# Patient Record
Sex: FEMALE | Race: WHITE | Employment: OTHER | URBAN - METROPOLITAN AREA
[De-identification: names, ages, dates, MRNs, and addresses within clinical notes are randomized per-mention and may not be internally consistent; named-entity substitution may affect disease eponyms.]

---

## 2017-12-03 ENCOUNTER — HOSPITAL ENCOUNTER (EMERGENCY)
Facility: HOSPITAL | Age: 67
Discharge: HOME | End: 2017-12-03
Attending: EMERGENCY MEDICINE
Payer: COMMERCIAL

## 2017-12-03 ENCOUNTER — APPOINTMENT (OUTPATIENT)
Dept: CT IMAGING | Facility: HOSPITAL | Age: 67
End: 2017-12-03
Payer: COMMERCIAL

## 2017-12-03 VITALS
HEART RATE: 63 BPM | RESPIRATION RATE: 16 BRPM | OXYGEN SATURATION: 100 % | SYSTOLIC BLOOD PRESSURE: 145 MMHG | TEMPERATURE: 98 F | WEIGHT: 162 LBS | DIASTOLIC BLOOD PRESSURE: 82 MMHG | BODY MASS INDEX: 29.81 KG/M2 | HEIGHT: 62 IN

## 2017-12-03 DIAGNOSIS — S16.1XXA NECK STRAIN, INITIAL ENCOUNTER: Primary | ICD-10-CM

## 2017-12-03 PROCEDURE — 99284 EMERGENCY DEPT VISIT MOD MDM: CPT

## 2017-12-03 PROCEDURE — 70450 CT HEAD/BRAIN W/O DYE: CPT

## 2017-12-03 PROCEDURE — 72125 CT NECK SPINE W/O DYE: CPT

## 2017-12-03 RX ORDER — CYCLOBENZAPRINE HCL 10 MG
10 TABLET ORAL 3 TIMES DAILY PRN
Qty: 20 TABLET | Refills: 0 | Status: SHIPPED | OUTPATIENT
Start: 2017-12-03 | End: 2017-12-10

## 2017-12-03 RX ORDER — HYDROCODONE BITARTRATE AND ACETAMINOPHEN 5; 325 MG/1; MG/1
1-2 TABLET ORAL EVERY 4 HOURS PRN
Qty: 20 TABLET | Refills: 0 | Status: SHIPPED | OUTPATIENT
Start: 2017-12-03 | End: 2017-12-10
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
## 2020-03-12 RX ORDER — ALBUTEROL SULFATE 90 UG/1
2 AEROSOL, METERED RESPIRATORY (INHALATION) EVERY 4 HOURS PRN
COMMUNITY

## 2020-03-12 RX ORDER — NIFEDIPINE 30 MG/1
30 TABLET, FILM COATED, EXTENDED RELEASE ORAL 2 TIMES DAILY
COMMUNITY

## 2020-03-12 RX ORDER — TACROLIMUS 1 MG/1
1 CAPSULE ORAL 2 TIMES DAILY
COMMUNITY

## 2020-03-12 RX ORDER — PREDNISONE 10 MG/1
10 TABLET ORAL DAILY
Status: ON HOLD | COMMUNITY
End: 2023-05-31 | Stop reason: HOSPADM

## 2020-03-12 RX ORDER — LEVOTHYROXINE SODIUM 0.03 MG/1
25 TABLET ORAL DAILY
COMMUNITY

## 2020-03-12 RX ORDER — SIMVASTATIN 20 MG
20 TABLET ORAL NIGHTLY
COMMUNITY
End: 2023-05-21

## 2020-03-12 RX ORDER — HYDROCODONE BITARTRATE AND ACETAMINOPHEN 10; 325 MG/1; MG/1
1 TABLET ORAL EVERY 4 HOURS PRN
COMMUNITY

## 2020-03-12 RX ORDER — GABAPENTIN 600 MG/1
1200 TABLET ORAL 2 TIMES DAILY
COMMUNITY

## 2020-03-12 RX ORDER — VANCOMYCIN HYDROCHLORIDE 250 MG/1
250 CAPSULE ORAL PRN
COMMUNITY
End: 2023-05-21

## 2020-03-12 RX ORDER — CARVEDILOL 6.25 MG/1
6.25 TABLET ORAL
COMMUNITY

## 2020-03-17 ENCOUNTER — APPOINTMENT (OUTPATIENT)
Dept: SURGERY | Age: 70
End: 2020-03-17

## 2020-05-06 DIAGNOSIS — Z94.4 LIVER TRANSPLANTED (CMD): Primary | ICD-10-CM

## 2020-05-07 ENCOUNTER — LAB SERVICES (OUTPATIENT)
Dept: LAB | Age: 70
End: 2020-05-07

## 2020-05-07 DIAGNOSIS — Z94.4 LIVER TRANSPLANTED (CMD): ICD-10-CM

## 2020-05-07 LAB
ALBUMIN SERPL-MCNC: 3.4 G/DL (ref 3.6–5.1)
ALBUMIN/GLOB SERPL: 1 {RATIO} (ref 1–2.4)
ALP SERPL-CCNC: 127 UNITS/L (ref 45–117)
ALT SERPL-CCNC: 15 UNITS/L
ANION GAP SERPL CALC-SCNC: 8 MMOL/L (ref 10–20)
AST SERPL-CCNC: 21 UNITS/L
BILIRUB SERPL-MCNC: 0.4 MG/DL (ref 0.2–1)
BUN SERPL-MCNC: 10 MG/DL (ref 6–20)
BUN/CREAT SERPL: 17 (ref 7–25)
CALCIUM SERPL-MCNC: 8.8 MG/DL (ref 8.4–10.2)
CHLORIDE SERPL-SCNC: 108 MMOL/L (ref 98–107)
CO2 SERPL-SCNC: 27 MMOL/L (ref 21–32)
CREAT SERPL-MCNC: 0.58 MG/DL (ref 0.51–0.95)
ERYTHROCYTE [DISTWIDTH] IN BLOOD: 13.7 % (ref 11–15)
GLOBULIN SER-MCNC: 3.4 G/DL (ref 2–4)
GLUCOSE SERPL-MCNC: 89 MG/DL (ref 65–99)
HCT VFR BLD CALC: 41.5 % (ref 36–46.5)
HGB BLD-MCNC: 13.3 G/DL (ref 12–15.5)
LENGTH OF FAST TIME PATIENT: ABNORMAL HRS
MCH RBC QN AUTO: 27.5 PG (ref 26–34)
MCHC RBC AUTO-ENTMCNC: 32 G/DL (ref 32–36.5)
MCV RBC AUTO: 85.9 FL (ref 78–100)
NRBC BLD MANUAL-RTO: 0 /100 WBC
PLATELET # BLD: 274 K/MCL (ref 140–450)
POTASSIUM SERPL-SCNC: 3.9 MMOL/L (ref 3.4–5.1)
PROT SERPL-MCNC: 6.8 G/DL (ref 6.4–8.2)
RBC # BLD: 4.83 MIL/MCL (ref 4–5.2)
SODIUM SERPL-SCNC: 139 MMOL/L (ref 135–145)
WBC # BLD: 6.9 K/MCL (ref 4.2–11)

## 2020-05-08 LAB — TACROLIMUS BLD-MCNC: 3.9 NG/ML (ref 5–20)

## 2020-05-12 ENCOUNTER — V-VISIT (OUTPATIENT)
Dept: SURGERY | Age: 70
End: 2020-05-12

## 2020-05-12 VITALS — HEART RATE: 57 BPM | SYSTOLIC BLOOD PRESSURE: 120 MMHG | DIASTOLIC BLOOD PRESSURE: 60 MMHG | WEIGHT: 139 LBS

## 2020-05-12 DIAGNOSIS — D84.821 IMMUNOSUPPRESSION DUE TO DRUG THERAPY (CMD): Primary | ICD-10-CM

## 2020-05-12 DIAGNOSIS — Z79.899 IMMUNOSUPPRESSION DUE TO DRUG THERAPY (CMD): Primary | ICD-10-CM

## 2020-05-12 PROCEDURE — 99203 OFFICE O/P NEW LOW 30 MIN: CPT | Performed by: SURGERY

## 2020-08-27 DIAGNOSIS — Z79.899 IMMUNOSUPPRESSION DUE TO DRUG THERAPY (CMD): ICD-10-CM

## 2020-08-27 DIAGNOSIS — Z94.4 LIVER TRANSPLANTED (CMD): Primary | ICD-10-CM

## 2020-08-27 DIAGNOSIS — D84.821 IMMUNOSUPPRESSION DUE TO DRUG THERAPY (CMD): ICD-10-CM

## 2020-08-28 ENCOUNTER — HOSPITAL (OUTPATIENT)
Dept: OTHER | Age: 70
End: 2020-08-28
Attending: INTERNAL MEDICINE

## 2022-06-24 ENCOUNTER — APPOINTMENT (OUTPATIENT)
Dept: ULTRASOUND IMAGING | Age: 72
End: 2022-06-24
Attending: EMERGENCY MEDICINE

## 2022-06-24 ENCOUNTER — HOSPITAL ENCOUNTER (EMERGENCY)
Age: 72
Discharge: HOME | End: 2022-06-25
Attending: EMERGENCY MEDICINE

## 2022-06-24 DIAGNOSIS — R60.0 EDEMA OF BOTH LEGS: Primary | ICD-10-CM

## 2022-06-24 PROCEDURE — 93005 ELECTROCARDIOGRAM TRACING: CPT | Performed by: EMERGENCY MEDICINE

## 2022-06-24 PROCEDURE — 36415 COLL VENOUS BLD VENIPUNCTURE: CPT

## 2022-06-24 PROCEDURE — 99284 EMERGENCY DEPT VISIT MOD MDM: CPT

## 2022-06-24 PROCEDURE — 93970 EXTREMITY STUDY: CPT

## 2022-06-24 ASSESSMENT — PAIN SCALES - GENERAL: PAINLEVEL_OUTOF10: 0

## 2022-06-25 ENCOUNTER — APPOINTMENT (OUTPATIENT)
Dept: GENERAL RADIOLOGY | Age: 72
End: 2022-06-25
Attending: EMERGENCY MEDICINE

## 2022-06-25 VITALS
TEMPERATURE: 98.6 F | SYSTOLIC BLOOD PRESSURE: 105 MMHG | OXYGEN SATURATION: 91 % | HEIGHT: 64 IN | WEIGHT: 155.65 LBS | DIASTOLIC BLOOD PRESSURE: 92 MMHG | HEART RATE: 61 BPM | RESPIRATION RATE: 18 BRPM | BODY MASS INDEX: 26.57 KG/M2

## 2022-06-25 LAB
ALBUMIN SERPL-MCNC: 3.2 G/DL (ref 3.6–5.1)
ALBUMIN/GLOB SERPL: 1 {RATIO} (ref 1–2.4)
ALP SERPL-CCNC: 124 UNITS/L (ref 45–117)
ALT SERPL-CCNC: 17 UNITS/L
ANION GAP SERPL CALC-SCNC: 14 MMOL/L (ref 7–19)
AST SERPL-CCNC: 14 UNITS/L
BASOPHILS # BLD: 0.1 K/MCL (ref 0–0.3)
BASOPHILS NFR BLD: 1 %
BILIRUB SERPL-MCNC: 0.3 MG/DL (ref 0.2–1)
BUN SERPL-MCNC: 19 MG/DL (ref 6–20)
BUN/CREAT SERPL: 25 (ref 7–25)
CALCIUM SERPL-MCNC: 8.7 MG/DL (ref 8.4–10.2)
CHLORIDE SERPL-SCNC: 107 MMOL/L (ref 97–110)
CO2 SERPL-SCNC: 27 MMOL/L (ref 21–32)
CREAT SERPL-MCNC: 0.76 MG/DL (ref 0.51–0.95)
DEPRECATED RDW RBC: 44.5 FL (ref 39–50)
EOSINOPHIL # BLD: 0.2 K/MCL (ref 0–0.5)
EOSINOPHIL NFR BLD: 2 %
ERYTHROCYTE [DISTWIDTH] IN BLOOD: 13.8 % (ref 11–15)
FASTING DURATION TIME PATIENT: ABNORMAL H
GFR SERPLBLD BASED ON 1.73 SQ M-ARVRAT: 84 ML/MIN
GLOBULIN SER-MCNC: 3.3 G/DL (ref 2–4)
GLUCOSE SERPL-MCNC: 126 MG/DL (ref 70–99)
HCT VFR BLD CALC: 41.8 % (ref 36–46.5)
HGB BLD-MCNC: 13.8 G/DL (ref 12–15.5)
IMM GRANULOCYTES # BLD AUTO: 0 K/MCL (ref 0–0.2)
IMM GRANULOCYTES # BLD: 0 %
LYMPHOCYTES # BLD: 2.3 K/MCL (ref 1–4)
LYMPHOCYTES NFR BLD: 28 %
MCH RBC QN AUTO: 29.2 PG (ref 26–34)
MCHC RBC AUTO-ENTMCNC: 33 G/DL (ref 32–36.5)
MCV RBC AUTO: 88.4 FL (ref 78–100)
MONOCYTES # BLD: 0.9 K/MCL (ref 0.3–0.9)
MONOCYTES NFR BLD: 11 %
NEUTROPHILS # BLD: 4.8 K/MCL (ref 1.8–7.7)
NEUTROPHILS NFR BLD: 58 %
NRBC BLD MANUAL-RTO: 0 /100 WBC
NT-PROBNP SERPL-MCNC: 446 PG/ML
PLATELET # BLD AUTO: 223 K/MCL (ref 140–450)
POTASSIUM SERPL-SCNC: 4 MMOL/L (ref 3.4–5.1)
PROT SERPL-MCNC: 6.5 G/DL (ref 6.4–8.2)
RBC # BLD: 4.73 MIL/MCL (ref 4–5.2)
SODIUM SERPL-SCNC: 144 MMOL/L (ref 135–145)
TROPONIN I SERPL DL<=0.01 NG/ML-MCNC: 8 NG/L
WBC # BLD: 8.3 K/MCL (ref 4.2–11)

## 2022-06-25 PROCEDURE — 93010 ELECTROCARDIOGRAM REPORT: CPT | Performed by: INTERNAL MEDICINE

## 2022-06-25 PROCEDURE — 80053 COMPREHEN METABOLIC PANEL: CPT | Performed by: EMERGENCY MEDICINE

## 2022-06-25 PROCEDURE — 84484 ASSAY OF TROPONIN QUANT: CPT | Performed by: EMERGENCY MEDICINE

## 2022-06-25 PROCEDURE — 83880 ASSAY OF NATRIURETIC PEPTIDE: CPT | Performed by: EMERGENCY MEDICINE

## 2022-06-25 PROCEDURE — 10002800 HB RX 250 W HCPCS

## 2022-06-25 PROCEDURE — 71045 X-RAY EXAM CHEST 1 VIEW: CPT

## 2022-06-25 PROCEDURE — 85025 COMPLETE CBC W/AUTO DIFF WBC: CPT | Performed by: EMERGENCY MEDICINE

## 2022-06-25 RX ORDER — HEPARIN 100 UNIT/ML
SYRINGE INTRAVENOUS
Status: COMPLETED
Start: 2022-06-25 | End: 2022-06-25

## 2022-06-25 RX ADMIN — SODIUM CHLORIDE, PRESERVATIVE FREE 5 ML: 5 INJECTION INTRAVENOUS at 02:30

## 2022-06-27 LAB
P AXIS (DEGREES): 83
PR-INTERVAL (MSEC): 195
QRS-INTERVAL (MSEC): 110
QT-INTERVAL (MSEC): 452
QTC: 440
R AXIS (DEGREES): -5
REPORT TEXT: NORMAL
T AXIS (DEGREES): 77
VENTRICULAR RATE EKG/MIN (BPM): 55

## 2023-05-21 ENCOUNTER — APPOINTMENT (OUTPATIENT)
Dept: GENERAL RADIOLOGY | Age: 73
DRG: 871 | End: 2023-05-21
Attending: EMERGENCY MEDICINE

## 2023-05-21 ENCOUNTER — HOSPITAL ENCOUNTER (INPATIENT)
Age: 73
LOS: 11 days | Discharge: NURSING, REHAB OR LONG-TERM CARE | DRG: 871 | End: 2023-06-01
Attending: EMERGENCY MEDICINE | Admitting: EMERGENCY MEDICINE

## 2023-05-21 ENCOUNTER — APPOINTMENT (OUTPATIENT)
Dept: CT IMAGING | Age: 73
DRG: 871 | End: 2023-05-21
Attending: EMERGENCY MEDICINE

## 2023-05-21 DIAGNOSIS — R79.89 ELEVATED TROPONIN: ICD-10-CM

## 2023-05-21 DIAGNOSIS — A41.9 SEPTIC SHOCK (CMD): Primary | ICD-10-CM

## 2023-05-21 DIAGNOSIS — J96.01 ACUTE RESPIRATORY FAILURE WITH HYPOXIA (CMD): ICD-10-CM

## 2023-05-21 DIAGNOSIS — I48.91 ATRIAL FIBRILLATION WITH RAPID VENTRICULAR RESPONSE (CMD): ICD-10-CM

## 2023-05-21 DIAGNOSIS — R65.21 SEPTIC SHOCK (CMD): Primary | ICD-10-CM

## 2023-05-21 DIAGNOSIS — E87.6 HYPOKALEMIA: ICD-10-CM

## 2023-05-21 LAB
ALBUMIN SERPL-MCNC: 2.1 G/DL (ref 3.6–5.1)
ALBUMIN SERPL-MCNC: 2.5 G/DL (ref 3.6–5.1)
ALBUMIN/GLOB SERPL: 0.6 {RATIO} (ref 1–2.4)
ALBUMIN/GLOB SERPL: 0.8 {RATIO} (ref 1–2.4)
ALP SERPL-CCNC: 82 UNITS/L (ref 45–117)
ALP SERPL-CCNC: 83 UNITS/L (ref 45–117)
ALT SERPL-CCNC: 19 UNITS/L
ALT SERPL-CCNC: 21 UNITS/L
ANION GAP SERPL CALC-SCNC: 15 MMOL/L (ref 7–19)
ANION GAP SERPL CALC-SCNC: 18 MMOL/L (ref 7–19)
APPEARANCE UR: CLEAR
APTT PPP: 57 SEC (ref 22–30)
AST SERPL-CCNC: 26 UNITS/L
AST SERPL-CCNC: 31 UNITS/L
BACTERIA #/AREA URNS HPF: ABNORMAL /HPF
BASOPHILS # BLD: 0 K/MCL (ref 0–0.3)
BASOPHILS NFR BLD: 0 %
BILIRUB SERPL-MCNC: 0.7 MG/DL (ref 0.2–1)
BILIRUB SERPL-MCNC: 0.7 MG/DL (ref 0.2–1)
BILIRUB UR QL STRIP: NEGATIVE
BUN SERPL-MCNC: 53 MG/DL (ref 6–20)
BUN SERPL-MCNC: 58 MG/DL (ref 6–20)
BUN/CREAT SERPL: 34 (ref 7–25)
BUN/CREAT SERPL: 40 (ref 7–25)
CALCIUM SERPL-MCNC: 7.8 MG/DL (ref 8.4–10.2)
CALCIUM SERPL-MCNC: 7.9 MG/DL (ref 8.4–10.2)
CHLORIDE SERPL-SCNC: 101 MMOL/L (ref 97–110)
CHLORIDE SERPL-SCNC: 99 MMOL/L (ref 97–110)
CO2 SERPL-SCNC: 22 MMOL/L (ref 21–32)
CO2 SERPL-SCNC: 28 MMOL/L (ref 21–32)
COLOR UR: YELLOW
CREAT SERPL-MCNC: 1.32 MG/DL (ref 0.51–0.95)
CREAT SERPL-MCNC: 1.73 MG/DL (ref 0.51–0.95)
DEPRECATED RDW RBC: 47 FL (ref 39–50)
EOSINOPHIL # BLD: 0 K/MCL (ref 0–0.5)
EOSINOPHIL NFR BLD: 0 %
ERYTHROCYTE [DISTWIDTH] IN BLOOD: 14.4 % (ref 11–15)
FASTING DURATION TIME PATIENT: ABNORMAL H
FASTING DURATION TIME PATIENT: ABNORMAL H
GFR SERPLBLD BASED ON 1.73 SQ M-ARVRAT: 31 ML/MIN
GFR SERPLBLD BASED ON 1.73 SQ M-ARVRAT: 43 ML/MIN
GLOBULIN SER-MCNC: 3.3 G/DL (ref 2–4)
GLOBULIN SER-MCNC: 3.6 G/DL (ref 2–4)
GLUCOSE BLDC GLUCOMTR-MCNC: 158 MG/DL (ref 70–99)
GLUCOSE SERPL-MCNC: 115 MG/DL (ref 70–99)
GLUCOSE SERPL-MCNC: 255 MG/DL (ref 70–99)
GLUCOSE UR STRIP-MCNC: NEGATIVE MG/DL
HCT VFR BLD CALC: 41 % (ref 36–46.5)
HGB BLD-MCNC: 13.2 G/DL (ref 12–15.5)
HGB UR QL STRIP: NEGATIVE
HYALINE CASTS #/AREA URNS LPF: ABNORMAL /LPF
IMM GRANULOCYTES # BLD AUTO: 0.1 K/MCL (ref 0–0.2)
IMM GRANULOCYTES # BLD: 1 %
INR PPP: 1
KETONES UR STRIP-MCNC: NEGATIVE MG/DL
LACTATE BLDV-SCNC: 1.3 MMOL/L (ref 0–2)
LDH SERPL L TO P-CCNC: 223 UNITS/L (ref 82–240)
LEUKOCYTE ESTERASE UR QL STRIP: NEGATIVE
LYMPHOCYTES # BLD: 1 K/MCL (ref 1–4)
LYMPHOCYTES NFR BLD: 8 %
MCH RBC QN AUTO: 28.8 PG (ref 26–34)
MCHC RBC AUTO-ENTMCNC: 32.2 G/DL (ref 32–36.5)
MCV RBC AUTO: 89.3 FL (ref 78–100)
MONOCYTES # BLD: 0.6 K/MCL (ref 0.3–0.9)
MONOCYTES NFR BLD: 5 %
MUCOUS THREADS URNS QL MICRO: PRESENT
NEUTROPHILS # BLD: 10.4 K/MCL (ref 1.8–7.7)
NEUTROPHILS NFR BLD: 86 %
NITRITE UR QL STRIP: NEGATIVE
NRBC BLD MANUAL-RTO: 0 /100 WBC
PH UR STRIP: 5.5 [PH] (ref 5–7)
PLATELET # BLD AUTO: 184 K/MCL (ref 140–450)
POTASSIUM SERPL-SCNC: 2.7 MMOL/L (ref 3.4–5.1)
POTASSIUM SERPL-SCNC: 4 MMOL/L (ref 3.4–5.1)
PROCALCITONIN SERPL IA-MCNC: 5.77 NG/ML
PROT SERPL-MCNC: 5.7 G/DL (ref 6.4–8.2)
PROT SERPL-MCNC: 5.8 G/DL (ref 6.4–8.2)
PROT UR STRIP-MCNC: 30 MG/DL
PROTHROMBIN TIME: 10.6 SEC (ref 9.7–11.8)
RAINBOW EXTRA TUBES HOLD SPECIMEN: NORMAL
RBC # BLD: 4.59 MIL/MCL (ref 4–5.2)
RBC #/AREA URNS HPF: ABNORMAL /HPF
SODIUM SERPL-SCNC: 137 MMOL/L (ref 135–145)
SODIUM SERPL-SCNC: 139 MMOL/L (ref 135–145)
SP GR UR STRIP: 1.03 (ref 1–1.03)
SQUAMOUS #/AREA URNS HPF: ABNORMAL /HPF
TROPONIN I SERPL DL<=0.01 NG/ML-MCNC: 128 NG/L
TROPONIN I SERPL DL<=0.01 NG/ML-MCNC: 96 NG/L
UROBILINOGEN UR STRIP-MCNC: 0.2 MG/DL
WBC # BLD: 12.2 K/MCL (ref 4.2–11)
WBC #/AREA URNS HPF: ABNORMAL /HPF

## 2023-05-21 PROCEDURE — 93005 ELECTROCARDIOGRAM TRACING: CPT | Performed by: EMERGENCY MEDICINE

## 2023-05-21 PROCEDURE — 96365 THER/PROPH/DIAG IV INF INIT: CPT

## 2023-05-21 PROCEDURE — 10004180 HB COUNTER-TRANSPORT

## 2023-05-21 PROCEDURE — 84145 PROCALCITONIN (PCT): CPT | Performed by: EMERGENCY MEDICINE

## 2023-05-21 PROCEDURE — 36415 COLL VENOUS BLD VENIPUNCTURE: CPT

## 2023-05-21 PROCEDURE — 99291 CRITICAL CARE FIRST HOUR: CPT

## 2023-05-21 PROCEDURE — 10002800 HB RX 250 W HCPCS: Performed by: EMERGENCY MEDICINE

## 2023-05-21 PROCEDURE — 96375 TX/PRO/DX INJ NEW DRUG ADDON: CPT

## 2023-05-21 PROCEDURE — 10002807 HB RX 258: Performed by: EMERGENCY MEDICINE

## 2023-05-21 PROCEDURE — 87899 AGENT NOS ASSAY W/OPTIC: CPT | Performed by: EMERGENCY MEDICINE

## 2023-05-21 PROCEDURE — 85730 THROMBOPLASTIN TIME PARTIAL: CPT | Performed by: EMERGENCY MEDICINE

## 2023-05-21 PROCEDURE — 85610 PROTHROMBIN TIME: CPT | Performed by: EMERGENCY MEDICINE

## 2023-05-21 PROCEDURE — 83615 LACTATE (LD) (LDH) ENZYME: CPT | Performed by: EMERGENCY MEDICINE

## 2023-05-21 PROCEDURE — 96367 TX/PROPH/DG ADDL SEQ IV INF: CPT

## 2023-05-21 PROCEDURE — 13003292 HB HHF OXYGEN THERAPY DAILY

## 2023-05-21 PROCEDURE — 81001 URINALYSIS AUTO W/SCOPE: CPT | Performed by: EMERGENCY MEDICINE

## 2023-05-21 PROCEDURE — P9047 ALBUMIN (HUMAN), 25%, 50ML: HCPCS | Performed by: EMERGENCY MEDICINE

## 2023-05-21 PROCEDURE — 84484 ASSAY OF TROPONIN QUANT: CPT | Performed by: EMERGENCY MEDICINE

## 2023-05-21 PROCEDURE — 94640 AIRWAY INHALATION TREATMENT: CPT

## 2023-05-21 PROCEDURE — P9045 ALBUMIN (HUMAN), 5%, 250 ML: HCPCS | Performed by: EMERGENCY MEDICINE

## 2023-05-21 PROCEDURE — 10002801 HB RX 250 W/O HCPCS: Performed by: EMERGENCY MEDICINE

## 2023-05-21 PROCEDURE — 94660 CPAP INITIATION&MGMT: CPT

## 2023-05-21 PROCEDURE — 71045 X-RAY EXAM CHEST 1 VIEW: CPT

## 2023-05-21 PROCEDURE — 96368 THER/DIAG CONCURRENT INF: CPT

## 2023-05-21 PROCEDURE — 10004651 HB RX, NO CHARGE ITEM: Performed by: EMERGENCY MEDICINE

## 2023-05-21 PROCEDURE — 85025 COMPLETE CBC W/AUTO DIFF WBC: CPT | Performed by: EMERGENCY MEDICINE

## 2023-05-21 PROCEDURE — 83605 ASSAY OF LACTIC ACID: CPT | Performed by: EMERGENCY MEDICINE

## 2023-05-21 PROCEDURE — 10002803 HB RX 637: Performed by: EMERGENCY MEDICINE

## 2023-05-21 PROCEDURE — 87040 BLOOD CULTURE FOR BACTERIA: CPT | Performed by: EMERGENCY MEDICINE

## 2023-05-21 PROCEDURE — 10002801 HB RX 250 W/O HCPCS

## 2023-05-21 PROCEDURE — 80053 COMPREHEN METABOLIC PANEL: CPT | Performed by: EMERGENCY MEDICINE

## 2023-05-21 PROCEDURE — 96372 THER/PROPH/DIAG INJ SC/IM: CPT | Performed by: EMERGENCY MEDICINE

## 2023-05-21 PROCEDURE — 10000008 HB ROOM CHARGE ICU OR CCU

## 2023-05-21 PROCEDURE — 87449 NOS EACH ORGANISM AG IA: CPT | Performed by: EMERGENCY MEDICINE

## 2023-05-21 PROCEDURE — 71250 CT THORAX DX C-: CPT

## 2023-05-21 RX ORDER — 0.9 % SODIUM CHLORIDE 0.9 %
2 VIAL (ML) INJECTION EVERY 12 HOURS SCHEDULED
Status: DISCONTINUED | OUTPATIENT
Start: 2023-05-21 | End: 2023-06-01 | Stop reason: HOSPADM

## 2023-05-21 RX ORDER — IPRATROPIUM BROMIDE AND ALBUTEROL SULFATE 2.5; .5 MG/3ML; MG/3ML
3 SOLUTION RESPIRATORY (INHALATION) ONCE
Status: COMPLETED | OUTPATIENT
Start: 2023-05-21 | End: 2023-05-21

## 2023-05-21 RX ORDER — MONTELUKAST SODIUM 10 MG/1
10 TABLET ORAL AT BEDTIME
COMMUNITY
Start: 2023-03-20

## 2023-05-21 RX ORDER — ALBUMIN, HUMAN INJ 5% 5 %
25 SOLUTION INTRAVENOUS ONCE
Status: COMPLETED | OUTPATIENT
Start: 2023-05-21 | End: 2023-05-21

## 2023-05-21 RX ORDER — POTASSIUM CHLORIDE 14.9 MG/ML
20 INJECTION INTRAVENOUS ONCE
Status: COMPLETED | OUTPATIENT
Start: 2023-05-21 | End: 2023-05-21

## 2023-05-21 RX ORDER — NOREPINEPHRINE BITARTRATE/D5W 8 MG/250ML
0-30 PLASTIC BAG, INJECTION (ML) INTRAVENOUS CONTINUOUS
Status: DISCONTINUED | OUTPATIENT
Start: 2023-05-21 | End: 2023-05-22

## 2023-05-21 RX ORDER — METOCLOPRAMIDE HYDROCHLORIDE 5 MG/ML
10 INJECTION INTRAMUSCULAR; INTRAVENOUS ONCE
Status: DISCONTINUED | OUTPATIENT
Start: 2023-05-21 | End: 2023-05-22

## 2023-05-21 RX ORDER — FUROSEMIDE 40 MG/1
40 TABLET ORAL DAILY
COMMUNITY
Start: 2023-03-20

## 2023-05-21 RX ORDER — ALBUMIN (HUMAN) 12.5 G/50ML
25 SOLUTION INTRAVENOUS ONCE
Status: COMPLETED | OUTPATIENT
Start: 2023-05-21 | End: 2023-05-21

## 2023-05-21 RX ORDER — LEVOTHYROXINE SODIUM 0.03 MG/1
25 TABLET ORAL
Status: DISCONTINUED | OUTPATIENT
Start: 2023-05-22 | End: 2023-06-01 | Stop reason: HOSPADM

## 2023-05-21 RX ORDER — METOCLOPRAMIDE HYDROCHLORIDE 5 MG/ML
10 INJECTION INTRAMUSCULAR; INTRAVENOUS EVERY 6 HOURS PRN
Status: DISCONTINUED | OUTPATIENT
Start: 2023-05-21 | End: 2023-06-01 | Stop reason: HOSPADM

## 2023-05-21 RX ORDER — TACROLIMUS 1 MG/1
1 CAPSULE ORAL
Status: DISCONTINUED | OUTPATIENT
Start: 2023-05-21 | End: 2023-05-24

## 2023-05-21 RX ORDER — ONDANSETRON 4 MG/1
4 TABLET, ORALLY DISINTEGRATING ORAL EVERY 12 HOURS PRN
Status: DISCONTINUED | OUTPATIENT
Start: 2023-05-21 | End: 2023-06-01 | Stop reason: HOSPADM

## 2023-05-21 RX ORDER — COLCHICINE 0.6 MG/1
0.6 TABLET ORAL DAILY
COMMUNITY
Start: 2023-04-03

## 2023-05-21 RX ORDER — CEFAZOLIN SODIUM/WATER 1 G/10 ML
1000 SYRINGE (ML) INTRAVENOUS ONCE
Status: COMPLETED | OUTPATIENT
Start: 2023-05-21 | End: 2023-05-21

## 2023-05-21 RX ORDER — MONTELUKAST SODIUM 10 MG/1
10 TABLET ORAL AT BEDTIME
Status: DISCONTINUED | OUTPATIENT
Start: 2023-05-21 | End: 2023-06-01 | Stop reason: HOSPADM

## 2023-05-21 RX ORDER — HEPARIN SODIUM 5000 [USP'U]/ML
5000 INJECTION, SOLUTION INTRAVENOUS; SUBCUTANEOUS EVERY 8 HOURS SCHEDULED
Status: DISCONTINUED | OUTPATIENT
Start: 2023-05-21 | End: 2023-05-23

## 2023-05-21 RX ORDER — SODIUM CHLORIDE 9 MG/ML
INJECTION, SOLUTION INTRAVENOUS CONTINUOUS PRN
Status: DISCONTINUED | OUTPATIENT
Start: 2023-05-21 | End: 2023-05-25

## 2023-05-21 RX ORDER — NOREPINEPHRINE BITARTRATE/D5W 8 MG/250ML
PLASTIC BAG, INJECTION (ML) INTRAVENOUS
Status: COMPLETED
Start: 2023-05-21 | End: 2023-05-21

## 2023-05-21 RX ORDER — METOCLOPRAMIDE 5 MG/1
5 TABLET ORAL EVERY 6 HOURS PRN
Status: DISCONTINUED | OUTPATIENT
Start: 2023-05-21 | End: 2023-06-01 | Stop reason: HOSPADM

## 2023-05-21 RX ORDER — ACETAMINOPHEN 325 MG/1
650 TABLET ORAL EVERY 4 HOURS PRN
Status: DISCONTINUED | OUTPATIENT
Start: 2023-05-21 | End: 2023-06-01 | Stop reason: HOSPADM

## 2023-05-21 RX ORDER — METHYLPREDNISOLONE SODIUM SUCCINATE 125 MG/2ML
125 INJECTION, POWDER, LYOPHILIZED, FOR SOLUTION INTRAMUSCULAR; INTRAVENOUS ONCE
Status: COMPLETED | OUTPATIENT
Start: 2023-05-21 | End: 2023-05-21

## 2023-05-21 RX ORDER — LIDOCAINE HYDROCHLORIDE 20 MG/ML
10 JELLY TOPICAL
Status: DISCONTINUED | OUTPATIENT
Start: 2023-05-21 | End: 2023-05-25

## 2023-05-21 RX ORDER — ONDANSETRON 2 MG/ML
4 INJECTION INTRAMUSCULAR; INTRAVENOUS EVERY 12 HOURS PRN
Status: DISCONTINUED | OUTPATIENT
Start: 2023-05-21 | End: 2023-06-01 | Stop reason: HOSPADM

## 2023-05-21 RX ORDER — ASPIRIN 81 MG/1
81 TABLET ORAL DAILY
Status: DISCONTINUED | OUTPATIENT
Start: 2023-05-22 | End: 2023-05-26

## 2023-05-21 RX ORDER — SODIUM CHLORIDE 9 MG/ML
INJECTION, SOLUTION INTRAVENOUS CONTINUOUS PRN
Status: DISCONTINUED | OUTPATIENT
Start: 2023-05-21 | End: 2023-06-01 | Stop reason: HOSPADM

## 2023-05-21 RX ADMIN — PIPERACILLIN AND TAZOBACTAM 3.38 G: 3; .375 INJECTION, POWDER, FOR SOLUTION INTRAVENOUS at 14:19

## 2023-05-21 RX ADMIN — HEPARIN SODIUM 5000 UNITS: 5000 INJECTION INTRAVENOUS; SUBCUTANEOUS at 22:01

## 2023-05-21 RX ADMIN — POTASSIUM CHLORIDE 20 MEQ: 14.9 INJECTION, SOLUTION INTRAVENOUS at 12:58

## 2023-05-21 RX ADMIN — VANCOMYCIN HYDROCHLORIDE 1500 MG: 10 INJECTION, POWDER, LYOPHILIZED, FOR SOLUTION INTRAVENOUS at 14:19

## 2023-05-21 RX ADMIN — Medication 2 MCG/MIN: at 12:34

## 2023-05-21 RX ADMIN — POTASSIUM CHLORIDE 20 MEQ: 14.9 INJECTION, SOLUTION INTRAVENOUS at 16:12

## 2023-05-21 RX ADMIN — SODIUM CHLORIDE 2100 ML: 9 INJECTION, SOLUTION INTRAVENOUS at 11:35

## 2023-05-21 RX ADMIN — NOREPINEPHRINE BITARTRATE 2 MCG/MIN: 8 INJECTION, SOLUTION INTRAVENOUS at 12:34

## 2023-05-21 RX ADMIN — METHYLPREDNISOLONE SODIUM SUCCINATE 125 MG: 125 INJECTION, POWDER, FOR SOLUTION INTRAMUSCULAR; INTRAVENOUS at 11:30

## 2023-05-21 RX ADMIN — TACROLIMUS 1 MG: 1 CAPSULE ORAL at 20:01

## 2023-05-21 RX ADMIN — HEPARIN SODIUM 5000 UNITS: 5000 INJECTION INTRAVENOUS; SUBCUTANEOUS at 16:22

## 2023-05-21 RX ADMIN — ALBUMIN HUMAN 25 G: 0.25 SOLUTION INTRAVENOUS at 19:00

## 2023-05-21 RX ADMIN — ALBUMIN HUMAN 25 G: 0.05 INJECTION, SOLUTION INTRAVENOUS at 16:22

## 2023-05-21 RX ADMIN — IPRATROPIUM BROMIDE AND ALBUTEROL SULFATE 3 ML: .5; 3 SOLUTION RESPIRATORY (INHALATION) at 11:28

## 2023-05-21 RX ADMIN — IPRATROPIUM BROMIDE AND ALBUTEROL SULFATE 3 ML: .5; 3 SOLUTION RESPIRATORY (INHALATION) at 11:27

## 2023-05-21 RX ADMIN — MONTELUKAST SODIUM 10 MG: 10 TABLET, FILM COATED ORAL at 20:01

## 2023-05-21 RX ADMIN — CEFTRIAXONE SODIUM 1000 MG: 10 INJECTION, POWDER, FOR SOLUTION INTRAVENOUS at 11:30

## 2023-05-21 RX ADMIN — HYDROCORTISONE SODIUM SUCCINATE 100 MG: 100 INJECTION, POWDER, FOR SOLUTION INTRAMUSCULAR; INTRAVENOUS at 19:59

## 2023-05-21 RX ADMIN — CEFEPIME 2000 MG: 2 INJECTION, POWDER, FOR SOLUTION INTRAVENOUS at 18:28

## 2023-05-21 RX ADMIN — AZITHROMYCIN 500 MG: 500 INJECTION, POWDER, LYOPHILIZED, FOR SOLUTION INTRAVENOUS at 11:30

## 2023-05-21 RX ADMIN — SODIUM CHLORIDE, PRESERVATIVE FREE 2 ML: 5 INJECTION INTRAVENOUS at 20:08

## 2023-05-21 SDOH — HEALTH STABILITY: GENERAL: BECAUSE OF A PHYSICAL, MENTAL, OR EMOTIONAL CONDITION, DO YOU HAVE DIFFICULTY DOING ERRANDS ALONE?: NO

## 2023-05-21 SDOH — HEALTH STABILITY: GENERAL
BECAUSE OF A PHYSICAL, MENTAL, OR EMOTIONAL CONDITION, DO YOU HAVE SERIOUS DIFFICULTY CONCENTRATING, REMEMBERING OR MAKING DECISIONS?: NO

## 2023-05-21 SDOH — SOCIAL STABILITY: SOCIAL NETWORK
HOW OFTEN DO YOU SEE OR TALK TO PEOPLE THAT YOU CARE ABOUT AND FEEL CLOSE TO? (FOR EXAMPLE: TALKING TO FRIENDS ON THE PHONE, VISITING FRIENDS OR FAMILY, GOING TO CHURCH OR CLUB MEETINGS): 5 OR MORE TIMES A WEEK

## 2023-05-21 SDOH — ECONOMIC STABILITY: GENERAL

## 2023-05-21 SDOH — HEALTH STABILITY: PHYSICAL HEALTH: DO YOU HAVE DIFFICULTY DRESSING OR BATHING?: NO

## 2023-05-21 SDOH — ECONOMIC STABILITY: TRANSPORTATION INSECURITY
IN THE PAST 12 MONTHS, HAS LACK OF TRANSPORTATION KEPT YOU FROM MEETINGS, WORK, OR FROM GETTING THINGS NEEDED FOR DAILY LIVING?: NO

## 2023-05-21 SDOH — SOCIAL STABILITY: SOCIAL NETWORK: SUPPORT SYSTEMS: CHILDREN;FAMILY MEMBERS;FRIENDS;NEIGHBORS

## 2023-05-21 SDOH — ECONOMIC STABILITY: HOUSING INSECURITY: WHAT IS YOUR LIVING SITUATION TODAY?: CHILDREN

## 2023-05-21 SDOH — ECONOMIC STABILITY: HOUSING INSECURITY: WHAT IS YOUR LIVING SITUATION TODAY?: HOUSE

## 2023-05-21 SDOH — ECONOMIC STABILITY: HOUSING INSECURITY: ARE YOU WORRIED ABOUT LOSING YOUR HOUSING?: NO

## 2023-05-21 SDOH — ECONOMIC STABILITY: TRANSPORTATION INSECURITY
IN THE PAST 12 MONTHS, HAS THE LACK OF TRANSPORTATION KEPT YOU FROM MEDICAL APPOINTMENTS OR FROM GETTING MEDICATIONS?: NO

## 2023-05-21 SDOH — HEALTH STABILITY: PHYSICAL HEALTH: DO YOU HAVE SERIOUS DIFFICULTY WALKING OR CLIMBING STAIRS?: NO

## 2023-05-21 SDOH — ECONOMIC STABILITY: FOOD INSECURITY: HOW OFTEN IN THE PAST 12 MONTHS WERE YOU WORRIED OR STRESSED ABOUT HAVING ENOUGH MONEY TO BUY NUTRITIOUS MEALS?: NEVER

## 2023-05-21 ASSESSMENT — ORIENTATION MEMORY CONCENTRATION TEST (OMCT)
REPEAT THE NAME AND ADDRESS I ASKED YOU TO REMEMBER: CORRECT
COUNT BACKWARDS FROM 20 TO 1: 1 ERROR
WHAT MONTH IS IT NOW: CORRECT
OMCT INTERPRETATION: 7-10: MILD COGNITIVE IMPAIRMENT
WHAT YEAR IS IT NOW (MUST BE EXACT): CORRECT
WHAT TIME IS IT (NO WATCH OR CLOCK): INCORRECT
SAY THE MONTHS IN REVERSE ORDER STARTING WITH LAST MONTH: 1 ERROR
OMCT SCORE: 7

## 2023-05-21 ASSESSMENT — LIFESTYLE VARIABLES
AUDIT-C TOTAL SCORE: 0
HOW MANY STANDARD DRINKS CONTAINING ALCOHOL DO YOU HAVE ON A TYPICAL DAY: 0,1 OR 2
ALCOHOL_USE_STATUS: NO OR LOW RISK WITH VALIDATED TOOL
HOW OFTEN DO YOU HAVE 6 OR MORE DRINKS ON ONE OCCASION: NEVER
HOW OFTEN DO YOU HAVE A DRINK CONTAINING ALCOHOL: NEVER

## 2023-05-21 ASSESSMENT — PATIENT HEALTH QUESTIONNAIRE - PHQ9
CLINICAL INTERPRETATION OF PHQ2 SCORE: NO FURTHER SCREENING NEEDED
IS PATIENT ABLE TO COMPLETE PHQ2 OR PHQ9: YES
SUM OF ALL RESPONSES TO PHQ9 QUESTIONS 1 AND 2: 0

## 2023-05-21 ASSESSMENT — ACTIVITIES OF DAILY LIVING (ADL)
RECENT_DECLINE_ADL: YES, ACUTE ILLNESS WITHOUT THERAPY NEEDS
ADL_SCORE: 12
ADL_SHORT_OF_BREATH: NO
ADL_BEFORE_ADMISSION: INDEPENDENT

## 2023-05-21 ASSESSMENT — PAIN SCALES - GENERAL
PAINLEVEL_OUTOF10: 0
PAINLEVEL_OUTOF10: 0

## 2023-05-22 ENCOUNTER — APPOINTMENT (OUTPATIENT)
Dept: CARDIOLOGY | Age: 73
DRG: 871 | End: 2023-05-22
Attending: EMERGENCY MEDICINE

## 2023-05-22 LAB
ALBUMIN SERPL-MCNC: 2.8 G/DL (ref 3.6–5.1)
ALBUMIN/GLOB SERPL: 0.9 {RATIO} (ref 1–2.4)
ALP SERPL-CCNC: 72 UNITS/L (ref 45–117)
ALT SERPL-CCNC: 20 UNITS/L
ANION GAP SERPL CALC-SCNC: 15 MMOL/L (ref 7–19)
AORTIC VALVE AREA (AVA): 0.94
AORTIC VALVE AREA: 2.67
ASCENDING AORTA (AAD): 3
AST SERPL-CCNC: 17 UNITS/L
AV MEAN GRADIENT (AVMG): 2
AV MEAN VELOCITY (AVMV): 0.68
AV PEAK GRADIENT (AVPG): 4
AV PEAK VELOCITY (AVPV): 0.99
AV STENOSIS SEVERITY TEXT: NORMAL
AVI LVOT PEAK GRADIENT (LVOTMG): 0.7
BASOPHILS # BLD: 0 K/MCL (ref 0–0.3)
BASOPHILS NFR BLD: 0 %
BILIRUB SERPL-MCNC: 0.6 MG/DL (ref 0.2–1)
BUN SERPL-MCNC: 38 MG/DL (ref 6–20)
BUN/CREAT SERPL: 45 (ref 7–25)
CALCIUM SERPL-MCNC: 8.4 MG/DL (ref 8.4–10.2)
CHLORIDE SERPL-SCNC: 104 MMOL/L (ref 97–110)
CO2 SERPL-SCNC: 26 MMOL/L (ref 21–32)
CREAT SERPL-MCNC: 0.84 MG/DL (ref 0.51–0.95)
DEPRECATED RDW RBC: 46.2 FL (ref 39–50)
E WAVE DECELARATION TIME (MDT): 8.9
EOSINOPHIL # BLD: 0 K/MCL (ref 0–0.5)
EOSINOPHIL NFR BLD: 0 %
ERYTHROCYTE [DISTWIDTH] IN BLOOD: 14.4 % (ref 11–15)
FASTING DURATION TIME PATIENT: ABNORMAL H
FLUAV RNA RESP QL NAA+PROBE: NOT DETECTED
FLUBV RNA RESP QL NAA+PROBE: NOT DETECTED
GFR SERPLBLD BASED ON 1.73 SQ M-ARVRAT: 74 ML/MIN
GLOBULIN SER-MCNC: 3.1 G/DL (ref 2–4)
GLUCOSE BLDC GLUCOMTR-MCNC: 216 MG/DL (ref 70–99)
GLUCOSE SERPL-MCNC: 172 MG/DL (ref 70–99)
HCT VFR BLD CALC: 39 % (ref 36–46.5)
HGB BLD-MCNC: 12.8 G/DL (ref 12–15.5)
IMM GRANULOCYTES # BLD AUTO: 0.1 K/MCL (ref 0–0.2)
IMM GRANULOCYTES # BLD: 1 %
INTERVENTRICULAR SEPTUM IN END DIASTOLE (IVSD): 1.31
L PNEUMO1 AG UR QL IA.RAPID: NORMAL
LEFT INTERNAL DIMENSION IN SYSTOLE (LVSD): 1
LEFT VENTRICULAR INTERNAL DIMENSION IN DIASTOLE (LVDD): 2.7
LEFT VENTRICULAR POSTERIOR WALL IN END DIASTOLE (LVPW): 4.2
LV EF: NORMAL %
LVOT 2D (LVOTD): 14.3
LVOT VTI (LVOTVTI): 0.81
LYMPHOCYTES # BLD: 0.3 K/MCL (ref 1–4)
LYMPHOCYTES NFR BLD: 4 %
MAGNESIUM SERPL-MCNC: 1.7 MG/DL (ref 1.7–2.4)
MCH RBC QN AUTO: 28.9 PG (ref 26–34)
MCHC RBC AUTO-ENTMCNC: 32.8 G/DL (ref 32–36.5)
MCV RBC AUTO: 88 FL (ref 78–100)
MONOCYTES # BLD: 0.3 K/MCL (ref 0.3–0.9)
MONOCYTES NFR BLD: 3 %
MRSA DNA SPEC QL NAA+PROBE: NOT DETECTED
MV E TISSUE VEL LAT (MELV): 0.82
MV E TISSUE VEL MED (MESV): 11.2
MV E WAVE VEL/E TISSUE VEL MED(MSR): 10.6
MV PEAK A VELOCITY (MVPAV): 95
NEUTROPHILS # BLD: 7.7 K/MCL (ref 1.8–7.7)
NEUTROPHILS NFR BLD: 92 %
NRBC BLD MANUAL-RTO: 0 /100 WBC
PHOSPHATE SERPL-MCNC: 3.1 MG/DL (ref 2.4–4.7)
PLATELET # BLD AUTO: 144 K/MCL (ref 140–450)
POTASSIUM SERPL-SCNC: 3.4 MMOL/L (ref 3.4–5.1)
PROT SERPL-MCNC: 5.9 G/DL (ref 6.4–8.2)
RBC # BLD: 4.43 MIL/MCL (ref 4–5.2)
RV END SYSTOLIC LONGITUDINAL STRAIN FREE WALL (RVGS): 2
S PNEUM AG UR QL IA.RAPID: NORMAL
SARS-COV-2 RNA RESP QL NAA+PROBE: NOT DETECTED
SERVICE CMNT-IMP: NORMAL
SERVICE CMNT-IMP: NORMAL
SODIUM SERPL-SCNC: 142 MMOL/L (ref 135–145)
TACROLIMUS BLD-MCNC: 11.7 NG/ML (ref 5–20)
TRICUSPID VALVE ANNULAR PEAK VELOCITY (TVAPV): 25
TV ESTIMATED RIGHT ARTERIAL PRESSURE (RAP): 13.4
WBC # BLD: 8.3 K/MCL (ref 4.2–11)

## 2023-05-22 PROCEDURE — P9047 ALBUMIN (HUMAN), 25%, 50ML: HCPCS | Performed by: EMERGENCY MEDICINE

## 2023-05-22 PROCEDURE — 10002800 HB RX 250 W HCPCS: Performed by: ANESTHESIOLOGY

## 2023-05-22 PROCEDURE — 10002800 HB RX 250 W HCPCS: Performed by: EMERGENCY MEDICINE

## 2023-05-22 PROCEDURE — 10000008 HB ROOM CHARGE ICU OR CCU

## 2023-05-22 PROCEDURE — 87641 MR-STAPH DNA AMP PROBE: CPT | Performed by: INTERNAL MEDICINE

## 2023-05-22 PROCEDURE — 94667 MNPJ CHEST WALL 1ST: CPT

## 2023-05-22 PROCEDURE — 84100 ASSAY OF PHOSPHORUS: CPT | Performed by: EMERGENCY MEDICINE

## 2023-05-22 PROCEDURE — 80197 ASSAY OF TACROLIMUS: CPT | Performed by: EMERGENCY MEDICINE

## 2023-05-22 PROCEDURE — 10004651 HB RX, NO CHARGE ITEM: Performed by: EMERGENCY MEDICINE

## 2023-05-22 PROCEDURE — 10002801 HB RX 250 W/O HCPCS: Performed by: EMERGENCY MEDICINE

## 2023-05-22 PROCEDURE — 0240U SARS-COV-2/INFLUENZA BY PCR: CPT | Performed by: NURSE PRACTITIONER

## 2023-05-22 PROCEDURE — 93306 TTE W/DOPPLER COMPLETE: CPT

## 2023-05-22 PROCEDURE — 93005 ELECTROCARDIOGRAM TRACING: CPT | Performed by: EMERGENCY MEDICINE

## 2023-05-22 PROCEDURE — 13003292 HB HHF OXYGEN THERAPY DAILY

## 2023-05-22 PROCEDURE — 80053 COMPREHEN METABOLIC PANEL: CPT | Performed by: EMERGENCY MEDICINE

## 2023-05-22 PROCEDURE — 93306 TTE W/DOPPLER COMPLETE: CPT | Performed by: INTERNAL MEDICINE

## 2023-05-22 PROCEDURE — 99222 1ST HOSP IP/OBS MODERATE 55: CPT | Performed by: SURGERY

## 2023-05-22 PROCEDURE — 87633 RESP VIRUS 12-25 TARGETS: CPT | Performed by: EMERGENCY MEDICINE

## 2023-05-22 PROCEDURE — 10002807 HB RX 258: Performed by: EMERGENCY MEDICINE

## 2023-05-22 PROCEDURE — 96372 THER/PROPH/DIAG INJ SC/IM: CPT | Performed by: EMERGENCY MEDICINE

## 2023-05-22 PROCEDURE — 85025 COMPLETE CBC W/AUTO DIFF WBC: CPT | Performed by: EMERGENCY MEDICINE

## 2023-05-22 PROCEDURE — 94640 AIRWAY INHALATION TREATMENT: CPT

## 2023-05-22 PROCEDURE — 87449 NOS EACH ORGANISM AG IA: CPT | Performed by: SURGERY

## 2023-05-22 PROCEDURE — 83735 ASSAY OF MAGNESIUM: CPT | Performed by: EMERGENCY MEDICINE

## 2023-05-22 PROCEDURE — 87070 CULTURE OTHR SPECIMN AEROBIC: CPT | Performed by: EMERGENCY MEDICINE

## 2023-05-22 PROCEDURE — 10002803 HB RX 637: Performed by: EMERGENCY MEDICINE

## 2023-05-22 PROCEDURE — 10002801 HB RX 250 W/O HCPCS

## 2023-05-22 RX ORDER — COLCHICINE 0.6 MG/1
0.6 TABLET ORAL DAILY
Status: DISCONTINUED | OUTPATIENT
Start: 2023-05-22 | End: 2023-06-01 | Stop reason: HOSPADM

## 2023-05-22 RX ORDER — GABAPENTIN 300 MG/1
600 CAPSULE ORAL EVERY 12 HOURS SCHEDULED
Status: DISCONTINUED | OUTPATIENT
Start: 2023-05-22 | End: 2023-05-30

## 2023-05-22 RX ORDER — DEXTROSE MONOHYDRATE 25 G/50ML
25 INJECTION, SOLUTION INTRAVENOUS PRN
Status: DISCONTINUED | OUTPATIENT
Start: 2023-05-22 | End: 2023-06-01 | Stop reason: HOSPADM

## 2023-05-22 RX ORDER — IPRATROPIUM BROMIDE AND ALBUTEROL SULFATE 2.5; .5 MG/3ML; MG/3ML
3 SOLUTION RESPIRATORY (INHALATION)
Status: DISCONTINUED | OUTPATIENT
Start: 2023-05-22 | End: 2023-06-01 | Stop reason: HOSPADM

## 2023-05-22 RX ORDER — METHYLPREDNISOLONE SODIUM SUCCINATE 40 MG/ML
40 INJECTION, POWDER, LYOPHILIZED, FOR SOLUTION INTRAMUSCULAR; INTRAVENOUS DAILY
Status: DISCONTINUED | OUTPATIENT
Start: 2023-05-23 | End: 2023-05-23

## 2023-05-22 RX ORDER — PREDNISONE 50 MG/1
50 TABLET ORAL
Status: DISCONTINUED | OUTPATIENT
Start: 2023-05-23 | End: 2023-05-22

## 2023-05-22 RX ORDER — NICOTINE POLACRILEX 4 MG
30 LOZENGE BUCCAL PRN
Status: DISCONTINUED | OUTPATIENT
Start: 2023-05-22 | End: 2023-06-01 | Stop reason: HOSPADM

## 2023-05-22 RX ORDER — IPRATROPIUM BROMIDE AND ALBUTEROL SULFATE 2.5; .5 MG/3ML; MG/3ML
SOLUTION RESPIRATORY (INHALATION)
Status: COMPLETED
Start: 2023-05-22 | End: 2023-05-22

## 2023-05-22 RX ORDER — PREDNISONE 10 MG/1
10 TABLET ORAL
Status: DISCONTINUED | OUTPATIENT
Start: 2023-05-23 | End: 2023-05-22

## 2023-05-22 RX ORDER — PREDNISONE 10 MG/1
10 TABLET ORAL
Status: DISCONTINUED | OUTPATIENT
Start: 2023-05-29 | End: 2023-05-24

## 2023-05-22 RX ORDER — CARVEDILOL 3.12 MG/1
3.12 TABLET ORAL EVERY 12 HOURS SCHEDULED
Status: DISCONTINUED | OUTPATIENT
Start: 2023-05-22 | End: 2023-05-23

## 2023-05-22 RX ORDER — ALBUMIN (HUMAN) 12.5 G/50ML
25 SOLUTION INTRAVENOUS ONCE
Status: COMPLETED | OUTPATIENT
Start: 2023-05-22 | End: 2023-05-23

## 2023-05-22 RX ORDER — POTASSIUM CHLORIDE 14.9 MG/ML
20 INJECTION INTRAVENOUS ONCE
Status: COMPLETED | OUTPATIENT
Start: 2023-05-22 | End: 2023-05-22

## 2023-05-22 RX ORDER — DEXTROSE MONOHYDRATE 25 G/50ML
12.5 INJECTION, SOLUTION INTRAVENOUS PRN
Status: DISCONTINUED | OUTPATIENT
Start: 2023-05-22 | End: 2023-06-01 | Stop reason: HOSPADM

## 2023-05-22 RX ORDER — NICOTINE POLACRILEX 4 MG
15 LOZENGE BUCCAL PRN
Status: DISCONTINUED | OUTPATIENT
Start: 2023-05-22 | End: 2023-06-01 | Stop reason: HOSPADM

## 2023-05-22 RX ORDER — METHYLPREDNISOLONE SODIUM SUCCINATE 40 MG/ML
40 INJECTION, POWDER, LYOPHILIZED, FOR SOLUTION INTRAMUSCULAR; INTRAVENOUS DAILY
Status: DISCONTINUED | OUTPATIENT
Start: 2023-05-23 | End: 2023-05-22

## 2023-05-22 RX ORDER — ALBUTEROL SULFATE 2.5 MG/3ML
2.5 SOLUTION RESPIRATORY (INHALATION)
Status: DISCONTINUED | OUTPATIENT
Start: 2023-05-22 | End: 2023-06-01 | Stop reason: HOSPADM

## 2023-05-22 RX ORDER — METOPROLOL TARTRATE 1 MG/ML
5 INJECTION, SOLUTION INTRAVENOUS
Status: COMPLETED | OUTPATIENT
Start: 2023-05-22 | End: 2023-05-22

## 2023-05-22 RX ADMIN — AZITHROMYCIN 500 MG: 500 INJECTION, POWDER, LYOPHILIZED, FOR SOLUTION INTRAVENOUS at 09:11

## 2023-05-22 RX ADMIN — SODIUM CHLORIDE, PRESERVATIVE FREE 2 ML: 5 INJECTION INTRAVENOUS at 20:05

## 2023-05-22 RX ADMIN — TACROLIMUS 1 MG: 1 CAPSULE ORAL at 09:11

## 2023-05-22 RX ADMIN — HYDROCORTISONE SODIUM SUCCINATE 100 MG: 100 INJECTION, POWDER, FOR SOLUTION INTRAMUSCULAR; INTRAVENOUS at 05:43

## 2023-05-22 RX ADMIN — CARVEDILOL 3.12 MG: 3.12 TABLET, FILM COATED ORAL at 20:21

## 2023-05-22 RX ADMIN — ALBUTEROL SULFATE 2.5 MG: 2.5 SOLUTION RESPIRATORY (INHALATION) at 09:32

## 2023-05-22 RX ADMIN — METOPROLOL TARTRATE 5 MG: 1 INJECTION, SOLUTION INTRAVENOUS at 23:42

## 2023-05-22 RX ADMIN — ALBUTEROL SULFATE 2.5 MG: 2.5 SOLUTION RESPIRATORY (INHALATION) at 15:17

## 2023-05-22 RX ADMIN — LEVOTHYROXINE SODIUM 25 MCG: 0.03 TABLET ORAL at 05:54

## 2023-05-22 RX ADMIN — GABAPENTIN 600 MG: 300 CAPSULE ORAL at 20:21

## 2023-05-22 RX ADMIN — METOPROLOL TARTRATE 5 MG: 1 INJECTION, SOLUTION INTRAVENOUS at 23:12

## 2023-05-22 RX ADMIN — POTASSIUM CHLORIDE 20 MEQ: 14.9 INJECTION, SOLUTION INTRAVENOUS at 08:14

## 2023-05-22 RX ADMIN — METOPROLOL TARTRATE 5 MG: 1 INJECTION, SOLUTION INTRAVENOUS at 22:26

## 2023-05-22 RX ADMIN — HEPARIN SODIUM 5000 UNITS: 5000 INJECTION INTRAVENOUS; SUBCUTANEOUS at 13:44

## 2023-05-22 RX ADMIN — ALBUMIN HUMAN 25 G: 0.25 SOLUTION INTRAVENOUS at 23:55

## 2023-05-22 RX ADMIN — POTASSIUM CHLORIDE 20 MEQ: 14.9 INJECTION, SOLUTION INTRAVENOUS at 05:50

## 2023-05-22 RX ADMIN — MONTELUKAST SODIUM 10 MG: 10 TABLET, FILM COATED ORAL at 20:19

## 2023-05-22 RX ADMIN — MAGNESIUM SULFATE HEPTAHYDRATE 2 G: 40 INJECTION, SOLUTION INTRAVENOUS at 23:07

## 2023-05-22 RX ADMIN — ASPIRIN 81 MG: 81 TABLET, COATED ORAL at 09:11

## 2023-05-22 RX ADMIN — HYDROCORTISONE SODIUM SUCCINATE 100 MG: 100 INJECTION, POWDER, FOR SOLUTION INTRAMUSCULAR; INTRAVENOUS at 13:41

## 2023-05-22 RX ADMIN — MAGNESIUM SULFATE HEPTAHYDRATE 2 G: 40 INJECTION, SOLUTION INTRAVENOUS at 05:51

## 2023-05-22 RX ADMIN — CEFEPIME 2000 MG: 2 INJECTION, POWDER, FOR SOLUTION INTRAVENOUS at 13:48

## 2023-05-22 RX ADMIN — INSULIN LISPRO 2 UNITS: 100 INJECTION, SOLUTION INTRAVENOUS; SUBCUTANEOUS at 22:08

## 2023-05-22 RX ADMIN — TACROLIMUS 1 MG: 1 CAPSULE ORAL at 20:21

## 2023-05-22 RX ADMIN — GABAPENTIN 600 MG: 300 CAPSULE ORAL at 13:38

## 2023-05-22 RX ADMIN — IPRATROPIUM BROMIDE AND ALBUTEROL SULFATE: 2.5; .5 SOLUTION RESPIRATORY (INHALATION) at 21:05

## 2023-05-22 RX ADMIN — HEPARIN SODIUM 5000 UNITS: 5000 INJECTION INTRAVENOUS; SUBCUTANEOUS at 22:08

## 2023-05-22 RX ADMIN — VANCOMYCIN HYDROCHLORIDE 1000 MG: 1 INJECTION, POWDER, LYOPHILIZED, FOR SOLUTION INTRAVENOUS at 13:52

## 2023-05-22 RX ADMIN — HEPARIN SODIUM 5000 UNITS: 5000 INJECTION INTRAVENOUS; SUBCUTANEOUS at 06:03

## 2023-05-22 RX ADMIN — COLCHICINE 0.6 MG: 0.6 TABLET ORAL at 13:38

## 2023-05-22 ASSESSMENT — PAIN SCALES - GENERAL
PAINLEVEL_OUTOF10: 0

## 2023-05-23 ENCOUNTER — APPOINTMENT (OUTPATIENT)
Dept: GENERAL RADIOLOGY | Age: 73
DRG: 871 | End: 2023-05-23
Attending: EMERGENCY MEDICINE

## 2023-05-23 LAB
ALBUMIN SERPL-MCNC: 3 G/DL (ref 3.6–5.1)
ALBUMIN/GLOB SERPL: 1 {RATIO} (ref 1–2.4)
ALP SERPL-CCNC: 66 UNITS/L (ref 45–117)
ALT SERPL-CCNC: 18 UNITS/L
ANION GAP SERPL CALC-SCNC: 12 MMOL/L (ref 7–19)
APTT PPP: 23 SEC (ref 22–30)
APTT PPP: 28 SEC (ref 22–30)
APTT PPP: 29 SEC (ref 22–30)
AST SERPL-CCNC: 17 UNITS/L
BASOPHILS # BLD: 0 K/MCL (ref 0–0.3)
BASOPHILS NFR BLD: 0 %
BILIRUB SERPL-MCNC: 0.8 MG/DL (ref 0.2–1)
BUN SERPL-MCNC: 23 MG/DL (ref 6–20)
BUN/CREAT SERPL: 38 (ref 7–25)
C PNEUM DNA SPEC QL NAA+PROBE: NOT DETECTED
CALCIUM SERPL-MCNC: 8.4 MG/DL (ref 8.4–10.2)
CHLORIDE SERPL-SCNC: 106 MMOL/L (ref 97–110)
CO2 SERPL-SCNC: 28 MMOL/L (ref 21–32)
CREAT SERPL-MCNC: 0.61 MG/DL (ref 0.51–0.95)
DEPRECATED RDW RBC: 47.9 FL (ref 39–50)
EOSINOPHIL # BLD: 0 K/MCL (ref 0–0.5)
EOSINOPHIL NFR BLD: 0 %
ERYTHROCYTE [DISTWIDTH] IN BLOOD: 14.6 % (ref 11–15)
FASTING DURATION TIME PATIENT: ABNORMAL H
FLUAV H1 2009 PAND RNA SPEC QL NAA+PROBE: NOT DETECTED
FLUAV H1 RNA SPEC QL NAA+PROBE: NOT DETECTED
FLUAV H3 RNA SPEC QL NAA+PROBE: NOT DETECTED
FLUAV RNA SPEC QL NAA+PROBE: NORMAL
FLUBV RNA SPEC QL NAA+PROBE: NOT DETECTED
GFR SERPLBLD BASED ON 1.73 SQ M-ARVRAT: >90 ML/MIN
GLOBULIN SER-MCNC: 3 G/DL (ref 2–4)
GLUCOSE BLDC GLUCOMTR-MCNC: 130 MG/DL (ref 70–99)
GLUCOSE BLDC GLUCOMTR-MCNC: 130 MG/DL (ref 70–99)
GLUCOSE BLDC GLUCOMTR-MCNC: 136 MG/DL (ref 70–99)
GLUCOSE BLDC GLUCOMTR-MCNC: 166 MG/DL (ref 70–99)
GLUCOSE SERPL-MCNC: 167 MG/DL (ref 70–99)
HADV DNA SPEC QL NAA+PROBE: NOT DETECTED
HBOV DNA SPEC QL NAA+PROBE: NOT DETECTED
HCOV 229E RNA SPEC QL NAA+PROBE: NOT DETECTED
HCOV HKU1 RNA SPEC QL NAA+PROBE: NOT DETECTED
HCOV NL63 RNA SPEC QL NAA+PROBE: NOT DETECTED
HCOV OC43 RNA SPEC QL NAA+PROBE: NOT DETECTED
HCT VFR BLD CALC: 37.8 % (ref 36–46.5)
HGB BLD-MCNC: 12.2 G/DL (ref 12–15.5)
HMPV RNA SPEC QL NAA+PROBE: NOT DETECTED
HPIV1 RNA SPEC QL NAA+PROBE: NOT DETECTED
HPIV2 RNA SPEC QL NAA+PROBE: NOT DETECTED
HPIV3 RNA SPEC QL NAA+PROBE: NOT DETECTED
HPIV4 RNA SPEC QL NAA+PROBE: NOT DETECTED
IMM GRANULOCYTES # BLD AUTO: 0.1 K/MCL (ref 0–0.2)
IMM GRANULOCYTES # BLD: 1 %
INR PPP: 0.9
L PNEUMO DNA SPEC QL NAA+PROBE: NOT DETECTED
LYMPHOCYTES # BLD: 0.6 K/MCL (ref 1–4)
LYMPHOCYTES NFR BLD: 6 %
M PNEUMO DNA SPEC QL NAA+PROBE: NOT DETECTED
MAGNESIUM SERPL-MCNC: 2.8 MG/DL (ref 1.7–2.4)
MCH RBC QN AUTO: 29 PG (ref 26–34)
MCHC RBC AUTO-ENTMCNC: 32.3 G/DL (ref 32–36.5)
MCV RBC AUTO: 90 FL (ref 78–100)
MONOCYTES # BLD: 0.8 K/MCL (ref 0.3–0.9)
MONOCYTES NFR BLD: 8 %
NEUTROPHILS # BLD: 8.5 K/MCL (ref 1.8–7.7)
NEUTROPHILS NFR BLD: 85 %
NRBC BLD MANUAL-RTO: 0 /100 WBC
PHOSPHATE SERPL-MCNC: 2 MG/DL (ref 2.4–4.7)
PLATELET # BLD AUTO: 175 K/MCL (ref 140–450)
POTASSIUM SERPL-SCNC: 3.4 MMOL/L (ref 3.4–5.1)
POTASSIUM SERPL-SCNC: 3.9 MMOL/L (ref 3.4–5.1)
PROT SERPL-MCNC: 6 G/DL (ref 6.4–8.2)
PROTHROMBIN TIME: 9.5 SEC (ref 9.7–11.8)
QRS-INTERVAL (MSEC): 107
QRS-INTERVAL (MSEC): 109
QRS-INTERVAL (MSEC): 115
QT-INTERVAL (MSEC): 305
QT-INTERVAL (MSEC): 333
QT-INTERVAL (MSEC): 349
QTC: 374
QTC: 401
QTC: 418
R AXIS (DEGREES): -22
R AXIS (DEGREES): -30
R AXIS (DEGREES): -7
RBC # BLD: 4.2 MIL/MCL (ref 4–5.2)
REPORT TEXT: NORMAL
RSV A RNA SPEC QL NAA+PROBE: NOT DETECTED
RSV B RNA SPEC QL NAA+PROBE: NOT DETECTED
RV+EV RNA SPEC QL NAA+PROBE: NOT DETECTED
SERVICE CMNT-IMP: NORMAL
SODIUM SERPL-SCNC: 143 MMOL/L (ref 135–145)
T AXIS (DEGREES): 65
T AXIS (DEGREES): 87
T AXIS (DEGREES): 91
TACROLIMUS BLD-MCNC: 9.6 NG/ML (ref 5–20)
VENTRICULAR RATE EKG/MIN (BPM): 115
VENTRICULAR RATE EKG/MIN (BPM): 117
VENTRICULAR RATE EKG/MIN (BPM): 117
WBC # BLD: 9.9 K/MCL (ref 4.2–11)

## 2023-05-23 PROCEDURE — 85730 THROMBOPLASTIN TIME PARTIAL: CPT | Performed by: EMERGENCY MEDICINE

## 2023-05-23 PROCEDURE — 85025 COMPLETE CBC W/AUTO DIFF WBC: CPT | Performed by: EMERGENCY MEDICINE

## 2023-05-23 PROCEDURE — 10002801 HB RX 250 W/O HCPCS: Performed by: EMERGENCY MEDICINE

## 2023-05-23 PROCEDURE — 84132 ASSAY OF SERUM POTASSIUM: CPT | Performed by: EMERGENCY MEDICINE

## 2023-05-23 PROCEDURE — 10002807 HB RX 258: Performed by: EMERGENCY MEDICINE

## 2023-05-23 PROCEDURE — 10002803 HB RX 637: Performed by: INTERNAL MEDICINE

## 2023-05-23 PROCEDURE — 10002803 HB RX 637: Performed by: EMERGENCY MEDICINE

## 2023-05-23 PROCEDURE — 10002800 HB RX 250 W HCPCS: Performed by: EMERGENCY MEDICINE

## 2023-05-23 PROCEDURE — 13003292 HB HHF OXYGEN THERAPY DAILY

## 2023-05-23 PROCEDURE — 80053 COMPREHEN METABOLIC PANEL: CPT | Performed by: EMERGENCY MEDICINE

## 2023-05-23 PROCEDURE — 85610 PROTHROMBIN TIME: CPT | Performed by: EMERGENCY MEDICINE

## 2023-05-23 PROCEDURE — 99223 1ST HOSP IP/OBS HIGH 75: CPT | Performed by: INTERNAL MEDICINE

## 2023-05-23 PROCEDURE — 71045 X-RAY EXAM CHEST 1 VIEW: CPT

## 2023-05-23 PROCEDURE — 96372 THER/PROPH/DIAG INJ SC/IM: CPT | Performed by: EMERGENCY MEDICINE

## 2023-05-23 PROCEDURE — 10004651 HB RX, NO CHARGE ITEM: Performed by: EMERGENCY MEDICINE

## 2023-05-23 PROCEDURE — 84100 ASSAY OF PHOSPHORUS: CPT | Performed by: EMERGENCY MEDICINE

## 2023-05-23 PROCEDURE — 10000008 HB ROOM CHARGE ICU OR CCU

## 2023-05-23 PROCEDURE — 99233 SBSQ HOSP IP/OBS HIGH 50: CPT | Performed by: SURGERY

## 2023-05-23 PROCEDURE — 94640 AIRWAY INHALATION TREATMENT: CPT

## 2023-05-23 PROCEDURE — 83735 ASSAY OF MAGNESIUM: CPT | Performed by: EMERGENCY MEDICINE

## 2023-05-23 PROCEDURE — 10004180 HB COUNTER-TRANSPORT

## 2023-05-23 PROCEDURE — 80197 ASSAY OF TACROLIMUS: CPT | Performed by: EMERGENCY MEDICINE

## 2023-05-23 RX ORDER — METOPROLOL TARTRATE 1 MG/ML
5 INJECTION, SOLUTION INTRAVENOUS ONCE
Status: COMPLETED | OUTPATIENT
Start: 2023-05-23 | End: 2023-05-23

## 2023-05-23 RX ORDER — METOPROLOL TARTRATE 50 MG/1
50 TABLET, FILM COATED ORAL EVERY 6 HOURS SCHEDULED
Status: DISCONTINUED | OUTPATIENT
Start: 2023-05-23 | End: 2023-05-24

## 2023-05-23 RX ORDER — CARVEDILOL 6.25 MG/1
6.25 TABLET ORAL EVERY 12 HOURS SCHEDULED
Status: DISCONTINUED | OUTPATIENT
Start: 2023-05-23 | End: 2023-05-23

## 2023-05-23 RX ORDER — DIGOXIN 0.25 MG/ML
250 INJECTION INTRAMUSCULAR; INTRAVENOUS ONCE
Status: COMPLETED | OUTPATIENT
Start: 2023-05-23 | End: 2023-05-23

## 2023-05-23 RX ORDER — POTASSIUM CHLORIDE 20 MEQ/1
40 TABLET, EXTENDED RELEASE ORAL ONCE
Status: COMPLETED | OUTPATIENT
Start: 2023-05-23 | End: 2023-05-23

## 2023-05-23 RX ORDER — METOPROLOL TARTRATE 25 MG/1
12.5 TABLET, FILM COATED ORAL EVERY 6 HOURS
Status: DISCONTINUED | OUTPATIENT
Start: 2023-05-23 | End: 2023-05-23

## 2023-05-23 RX ORDER — HEPARIN SODIUM 10000 [USP'U]/100ML
1-30 INJECTION, SOLUTION INTRAVENOUS CONTINUOUS
Status: DISCONTINUED | OUTPATIENT
Start: 2023-05-23 | End: 2023-05-25

## 2023-05-23 RX ORDER — METHYLPREDNISOLONE SODIUM SUCCINATE 40 MG/ML
20 INJECTION, POWDER, LYOPHILIZED, FOR SOLUTION INTRAMUSCULAR; INTRAVENOUS DAILY
Status: DISCONTINUED | OUTPATIENT
Start: 2023-05-24 | End: 2023-05-23

## 2023-05-23 RX ORDER — METHYLPREDNISOLONE SODIUM SUCCINATE 40 MG/ML
20 INJECTION, POWDER, LYOPHILIZED, FOR SOLUTION INTRAMUSCULAR; INTRAVENOUS DAILY
Status: COMPLETED | OUTPATIENT
Start: 2023-05-23 | End: 2023-05-24

## 2023-05-23 RX ADMIN — HEPARIN SODIUM 5000 UNITS: 5000 INJECTION INTRAVENOUS; SUBCUTANEOUS at 05:36

## 2023-05-23 RX ADMIN — MONTELUKAST SODIUM 10 MG: 10 TABLET, FILM COATED ORAL at 20:30

## 2023-05-23 RX ADMIN — IPRATROPIUM BROMIDE AND ALBUTEROL SULFATE 3 ML: 2.5; .5 SOLUTION RESPIRATORY (INHALATION) at 03:41

## 2023-05-23 RX ADMIN — LEVOTHYROXINE SODIUM 25 MCG: 0.03 TABLET ORAL at 05:37

## 2023-05-23 RX ADMIN — DOXYCYCLINE 100 MG: 100 INJECTION, POWDER, LYOPHILIZED, FOR SOLUTION INTRAVENOUS at 09:49

## 2023-05-23 RX ADMIN — COLCHICINE 0.6 MG: 0.6 TABLET ORAL at 09:44

## 2023-05-23 RX ADMIN — IPRATROPIUM BROMIDE AND ALBUTEROL SULFATE 3 ML: 2.5; .5 SOLUTION RESPIRATORY (INHALATION) at 06:46

## 2023-05-23 RX ADMIN — NYSTATIN 500000 UNITS: 100000 SUSPENSION ORAL at 13:44

## 2023-05-23 RX ADMIN — GABAPENTIN 600 MG: 300 CAPSULE ORAL at 20:30

## 2023-05-23 RX ADMIN — METOPROLOL TARTRATE 5 MG: 1 INJECTION, SOLUTION INTRAVENOUS at 05:36

## 2023-05-23 RX ADMIN — TACROLIMUS 1 MG: 1 CAPSULE ORAL at 09:44

## 2023-05-23 RX ADMIN — DIGOXIN 250 MCG: 0.25 INJECTION INTRAMUSCULAR; INTRAVENOUS at 19:15

## 2023-05-23 RX ADMIN — METOPROLOL TARTRATE 50 MG: 50 TABLET, FILM COATED ORAL at 23:06

## 2023-05-23 RX ADMIN — DIGOXIN 250 MCG: 0.25 INJECTION INTRAMUSCULAR; INTRAVENOUS at 13:29

## 2023-05-23 RX ADMIN — GABAPENTIN 600 MG: 300 CAPSULE ORAL at 09:43

## 2023-05-23 RX ADMIN — TACROLIMUS 1 MG: 1 CAPSULE ORAL at 20:30

## 2023-05-23 RX ADMIN — SODIUM CHLORIDE, POTASSIUM CHLORIDE, SODIUM LACTATE AND CALCIUM CHLORIDE 500 ML: 600; 310; 30; 20 INJECTION, SOLUTION INTRAVENOUS at 04:34

## 2023-05-23 RX ADMIN — DIBASIC SODIUM PHOSPHATE, MONOBASIC POTASSIUM PHOSPHATE AND MONOBASIC SODIUM PHOSPHATE 250 MG: 852; 155; 130 TABLET ORAL at 05:53

## 2023-05-23 RX ADMIN — CARVEDILOL 6.25 MG: 6.25 TABLET, FILM COATED ORAL at 06:19

## 2023-05-23 RX ADMIN — ASPIRIN 81 MG: 81 TABLET, COATED ORAL at 09:43

## 2023-05-23 RX ADMIN — IPRATROPIUM BROMIDE AND ALBUTEROL SULFATE 3 ML: 2.5; .5 SOLUTION RESPIRATORY (INHALATION) at 23:33

## 2023-05-23 RX ADMIN — HEPARIN SODIUM 12 UNITS/KG/HR: 10000 INJECTION, SOLUTION INTRAVENOUS at 09:56

## 2023-05-23 RX ADMIN — CEFEPIME 2000 MG: 2 INJECTION, POWDER, FOR SOLUTION INTRAVENOUS at 13:42

## 2023-05-23 RX ADMIN — IPRATROPIUM BROMIDE AND ALBUTEROL SULFATE 3 ML: 2.5; .5 SOLUTION RESPIRATORY (INHALATION) at 19:59

## 2023-05-23 RX ADMIN — IPRATROPIUM BROMIDE AND ALBUTEROL SULFATE 3 ML: 2.5; .5 SOLUTION RESPIRATORY (INHALATION) at 15:39

## 2023-05-23 RX ADMIN — METHYLPREDNISOLONE SODIUM SUCCINATE 20 MG: 40 INJECTION INTRAMUSCULAR; INTRAVENOUS at 16:30

## 2023-05-23 RX ADMIN — DOXYCYCLINE 100 MG: 100 INJECTION, POWDER, LYOPHILIZED, FOR SOLUTION INTRAVENOUS at 20:35

## 2023-05-23 RX ADMIN — METOPROLOL TARTRATE 12.5 MG: 25 TABLET, FILM COATED ORAL at 16:36

## 2023-05-23 RX ADMIN — POTASSIUM CHLORIDE 40 MEQ: 1500 TABLET, EXTENDED RELEASE ORAL at 19:24

## 2023-05-23 RX ADMIN — POTASSIUM CHLORIDE 40 MEQ: 1500 TABLET, EXTENDED RELEASE ORAL at 05:54

## 2023-05-23 RX ADMIN — METOPROLOL TARTRATE 5 MG: 1 INJECTION, SOLUTION INTRAVENOUS at 13:30

## 2023-05-23 RX ADMIN — SODIUM CHLORIDE, PRESERVATIVE FREE 2 ML: 5 INJECTION INTRAVENOUS at 20:35

## 2023-05-23 RX ADMIN — IPRATROPIUM BROMIDE AND ALBUTEROL SULFATE 3 ML: 2.5; .5 SOLUTION RESPIRATORY (INHALATION) at 00:24

## 2023-05-23 RX ADMIN — DIBASIC SODIUM PHOSPHATE, MONOBASIC POTASSIUM PHOSPHATE AND MONOBASIC SODIUM PHOSPHATE 250 MG: 852; 155; 130 TABLET ORAL at 13:31

## 2023-05-23 ASSESSMENT — PAIN SCALES - GENERAL
PAINLEVEL_OUTOF10: 0

## 2023-05-24 ENCOUNTER — APPOINTMENT (OUTPATIENT)
Dept: GENERAL RADIOLOGY | Age: 73
DRG: 871 | End: 2023-05-24

## 2023-05-24 LAB
1,3 BETA GLUCAN SER-MCNC: <31 PG/ML
1,3 BETA GLUCAN SER-MCNC: <31 PG/ML
ALBUMIN SERPL-MCNC: 2.9 G/DL (ref 3.6–5.1)
ALBUMIN/GLOB SERPL: 1 {RATIO} (ref 1–2.4)
ALP SERPL-CCNC: 71 UNITS/L (ref 45–117)
ALT SERPL-CCNC: 29 UNITS/L
ANION GAP SERPL CALC-SCNC: 15 MMOL/L (ref 7–19)
ANION GAP SERPL CALC-SCNC: 15 MMOL/L (ref 7–19)
APTT PPP: 31 SEC (ref 22–30)
APTT PPP: 34 SEC (ref 22–30)
APTT PPP: 39 SEC (ref 22–30)
AST SERPL-CCNC: 32 UNITS/L
BACTERIA SPT AEROBE CULT: NORMAL
BASOPHILS # BLD: 0 K/MCL (ref 0–0.3)
BASOPHILS NFR BLD: 0 %
BILIRUB SERPL-MCNC: 0.9 MG/DL (ref 0.2–1)
BUN SERPL-MCNC: 14 MG/DL (ref 6–20)
BUN SERPL-MCNC: 15 MG/DL (ref 6–20)
BUN/CREAT SERPL: 24 (ref 7–25)
BUN/CREAT SERPL: 25 (ref 7–25)
CALCIUM SERPL-MCNC: 8.6 MG/DL (ref 8.4–10.2)
CALCIUM SERPL-MCNC: 8.6 MG/DL (ref 8.4–10.2)
CHLORIDE SERPL-SCNC: 103 MMOL/L (ref 97–110)
CHLORIDE SERPL-SCNC: 108 MMOL/L (ref 97–110)
CO2 SERPL-SCNC: 26 MMOL/L (ref 21–32)
CO2 SERPL-SCNC: 28 MMOL/L (ref 21–32)
CREAT SERPL-MCNC: 0.58 MG/DL (ref 0.51–0.95)
CREAT SERPL-MCNC: 0.61 MG/DL (ref 0.51–0.95)
DEPRECATED RDW RBC: 49 FL (ref 39–50)
EOSINOPHIL # BLD: 0 K/MCL (ref 0–0.5)
EOSINOPHIL NFR BLD: 0 %
ERYTHROCYTE [DISTWIDTH] IN BLOOD: 14.6 % (ref 11–15)
FASTING DURATION TIME PATIENT: ABNORMAL H
FASTING DURATION TIME PATIENT: ABNORMAL H
GFR SERPLBLD BASED ON 1.73 SQ M-ARVRAT: >90 ML/MIN
GFR SERPLBLD BASED ON 1.73 SQ M-ARVRAT: >90 ML/MIN
GLOBULIN SER-MCNC: 2.9 G/DL (ref 2–4)
GLUCOSE BLDC GLUCOMTR-MCNC: 120 MG/DL (ref 70–99)
GLUCOSE BLDC GLUCOMTR-MCNC: 130 MG/DL (ref 70–99)
GLUCOSE BLDC GLUCOMTR-MCNC: 154 MG/DL (ref 70–99)
GLUCOSE BLDC GLUCOMTR-MCNC: 159 MG/DL (ref 70–99)
GLUCOSE SERPL-MCNC: 122 MG/DL (ref 70–99)
GLUCOSE SERPL-MCNC: 142 MG/DL (ref 70–99)
GRAM STN SPEC: NORMAL
GRAM STN SPEC: NORMAL
HCT VFR BLD CALC: 40.2 % (ref 36–46.5)
HGB BLD-MCNC: 12.8 G/DL (ref 12–15.5)
IMM GRANULOCYTES # BLD AUTO: 0.2 K/MCL (ref 0–0.2)
IMM GRANULOCYTES # BLD: 2 %
LYMPHOCYTES # BLD: 0.7 K/MCL (ref 1–4)
LYMPHOCYTES NFR BLD: 7 %
MAGNESIUM SERPL-MCNC: 1.6 MG/DL (ref 1.7–2.4)
MAGNESIUM SERPL-MCNC: 2.4 MG/DL (ref 1.7–2.4)
MCH RBC QN AUTO: 29 PG (ref 26–34)
MCHC RBC AUTO-ENTMCNC: 31.8 G/DL (ref 32–36.5)
MCV RBC AUTO: 91.2 FL (ref 78–100)
MONOCYTES # BLD: 0.8 K/MCL (ref 0.3–0.9)
MONOCYTES NFR BLD: 9 %
NEUTROPHILS # BLD: 7.6 K/MCL (ref 1.8–7.7)
NEUTROPHILS NFR BLD: 82 %
NRBC BLD MANUAL-RTO: 0 /100 WBC
OBSERVATION IMP: NEGATIVE
OBSERVATION IMP: NEGATIVE
PHOSPHATE SERPL-MCNC: 2.6 MG/DL (ref 2.4–4.7)
PLATELET # BLD AUTO: 184 K/MCL (ref 140–450)
POTASSIUM SERPL-SCNC: 4 MMOL/L (ref 3.4–5.1)
POTASSIUM SERPL-SCNC: 4.7 MMOL/L (ref 3.4–5.1)
PROT SERPL-MCNC: 5.8 G/DL (ref 6.4–8.2)
RBC # BLD: 4.41 MIL/MCL (ref 4–5.2)
SODIUM SERPL-SCNC: 142 MMOL/L (ref 135–145)
SODIUM SERPL-SCNC: 144 MMOL/L (ref 135–145)
TACROLIMUS BLD-MCNC: 6.6 NG/ML (ref 5–20)
WBC # BLD: 9.3 K/MCL (ref 4.2–11)

## 2023-05-24 PROCEDURE — 85025 COMPLETE CBC W/AUTO DIFF WBC: CPT | Performed by: EMERGENCY MEDICINE

## 2023-05-24 PROCEDURE — 85730 THROMBOPLASTIN TIME PARTIAL: CPT | Performed by: EMERGENCY MEDICINE

## 2023-05-24 PROCEDURE — 10002803 HB RX 637: Performed by: INTERNAL MEDICINE

## 2023-05-24 PROCEDURE — 94640 AIRWAY INHALATION TREATMENT: CPT

## 2023-05-24 PROCEDURE — 13003292 HB HHF OXYGEN THERAPY DAILY

## 2023-05-24 PROCEDURE — 10004180 HB COUNTER-TRANSPORT

## 2023-05-24 PROCEDURE — 83735 ASSAY OF MAGNESIUM: CPT | Performed by: EMERGENCY MEDICINE

## 2023-05-24 PROCEDURE — 10002803 HB RX 637: Performed by: EMERGENCY MEDICINE

## 2023-05-24 PROCEDURE — 71045 X-RAY EXAM CHEST 1 VIEW: CPT

## 2023-05-24 PROCEDURE — 10002803 HB RX 637

## 2023-05-24 PROCEDURE — 85730 THROMBOPLASTIN TIME PARTIAL: CPT | Performed by: INTERNAL MEDICINE

## 2023-05-24 PROCEDURE — 10002807 HB RX 258: Performed by: EMERGENCY MEDICINE

## 2023-05-24 PROCEDURE — 10002801 HB RX 250 W/O HCPCS: Performed by: EMERGENCY MEDICINE

## 2023-05-24 PROCEDURE — 10004651 HB RX, NO CHARGE ITEM: Performed by: EMERGENCY MEDICINE

## 2023-05-24 PROCEDURE — 80048 BASIC METABOLIC PNL TOTAL CA: CPT

## 2023-05-24 PROCEDURE — 10002800 HB RX 250 W HCPCS: Performed by: EMERGENCY MEDICINE

## 2023-05-24 PROCEDURE — 10002800 HB RX 250 W HCPCS

## 2023-05-24 PROCEDURE — 10000008 HB ROOM CHARGE ICU OR CCU

## 2023-05-24 PROCEDURE — 84100 ASSAY OF PHOSPHORUS: CPT | Performed by: EMERGENCY MEDICINE

## 2023-05-24 PROCEDURE — 80197 ASSAY OF TACROLIMUS: CPT | Performed by: EMERGENCY MEDICINE

## 2023-05-24 PROCEDURE — 83735 ASSAY OF MAGNESIUM: CPT

## 2023-05-24 PROCEDURE — 96372 THER/PROPH/DIAG INJ SC/IM: CPT | Performed by: EMERGENCY MEDICINE

## 2023-05-24 PROCEDURE — 80053 COMPREHEN METABOLIC PANEL: CPT | Performed by: EMERGENCY MEDICINE

## 2023-05-24 PROCEDURE — 99233 SBSQ HOSP IP/OBS HIGH 50: CPT | Performed by: INTERNAL MEDICINE

## 2023-05-24 PROCEDURE — 99232 SBSQ HOSP IP/OBS MODERATE 35: CPT | Performed by: SURGERY

## 2023-05-24 PROCEDURE — 10002800 HB RX 250 W HCPCS: Performed by: INTERNAL MEDICINE

## 2023-05-24 RX ORDER — 0.9 % SODIUM CHLORIDE 0.9 %
20 VIAL (ML) INJECTION PRN
Status: DISCONTINUED | OUTPATIENT
Start: 2023-05-24 | End: 2023-06-01 | Stop reason: HOSPADM

## 2023-05-24 RX ORDER — DIGOXIN 0.25 MG/ML
250 INJECTION INTRAMUSCULAR; INTRAVENOUS ONCE
Status: COMPLETED | OUTPATIENT
Start: 2023-05-24 | End: 2023-05-24

## 2023-05-24 RX ORDER — FUROSEMIDE 10 MG/ML
20 INJECTION INTRAMUSCULAR; INTRAVENOUS ONCE
Status: DISCONTINUED | OUTPATIENT
Start: 2023-05-24 | End: 2023-05-24

## 2023-05-24 RX ORDER — FUROSEMIDE 10 MG/ML
20 INJECTION INTRAMUSCULAR; INTRAVENOUS ONCE
Status: COMPLETED | OUTPATIENT
Start: 2023-05-24 | End: 2023-05-24

## 2023-05-24 RX ORDER — PANTOPRAZOLE SODIUM 40 MG/1
40 TABLET, DELAYED RELEASE ORAL NIGHTLY
Status: DISCONTINUED | OUTPATIENT
Start: 2023-05-24 | End: 2023-06-01 | Stop reason: HOSPADM

## 2023-05-24 RX ORDER — 0.9 % SODIUM CHLORIDE 0.9 %
10 VIAL (ML) INJECTION EVERY 12 HOURS SCHEDULED
Status: DISCONTINUED | OUTPATIENT
Start: 2023-05-24 | End: 2023-06-01 | Stop reason: HOSPADM

## 2023-05-24 RX ORDER — POTASSIUM CHLORIDE 20 MEQ/1
40 TABLET, EXTENDED RELEASE ORAL ONCE
Status: COMPLETED | OUTPATIENT
Start: 2023-05-24 | End: 2023-05-24

## 2023-05-24 RX ORDER — FUROSEMIDE 20 MG/1
20 TABLET ORAL DAILY
Status: DISCONTINUED | OUTPATIENT
Start: 2023-05-25 | End: 2023-05-25

## 2023-05-24 RX ORDER — MAGNESIUM HYDROXIDE/ALUMINUM HYDROXICE/SIMETHICONE 120; 1200; 1200 MG/30ML; MG/30ML; MG/30ML
30 SUSPENSION ORAL EVERY 4 HOURS PRN
Status: DISCONTINUED | OUTPATIENT
Start: 2023-05-24 | End: 2023-06-01 | Stop reason: HOSPADM

## 2023-05-24 RX ORDER — PREDNISONE 10 MG/1
10 TABLET ORAL
Status: DISCONTINUED | OUTPATIENT
Start: 2023-05-25 | End: 2023-05-31

## 2023-05-24 RX ORDER — TACROLIMUS 0.5 MG/1
0.5 CAPSULE ORAL
Status: DISCONTINUED | OUTPATIENT
Start: 2023-05-24 | End: 2023-06-01 | Stop reason: HOSPADM

## 2023-05-24 RX ORDER — 0.9 % SODIUM CHLORIDE 0.9 %
10 VIAL (ML) INJECTION PRN
Status: DISCONTINUED | OUTPATIENT
Start: 2023-05-24 | End: 2023-06-01 | Stop reason: HOSPADM

## 2023-05-24 RX ADMIN — SODIUM CHLORIDE, PRESERVATIVE FREE 2 ML: 5 INJECTION INTRAVENOUS at 08:41

## 2023-05-24 RX ADMIN — METHYLPREDNISOLONE SODIUM SUCCINATE 20 MG: 40 INJECTION INTRAMUSCULAR; INTRAVENOUS at 09:03

## 2023-05-24 RX ADMIN — CEFEPIME 2000 MG: 2 INJECTION, POWDER, FOR SOLUTION INTRAVENOUS at 14:43

## 2023-05-24 RX ADMIN — METOPROLOL TARTRATE 75 MG: 50 TABLET, FILM COATED ORAL at 23:49

## 2023-05-24 RX ADMIN — IPRATROPIUM BROMIDE AND ALBUTEROL SULFATE 3 ML: 2.5; .5 SOLUTION RESPIRATORY (INHALATION) at 11:21

## 2023-05-24 RX ADMIN — HEPARIN SODIUM 21 UNITS/KG/HR: 10000 INJECTION, SOLUTION INTRAVENOUS at 06:39

## 2023-05-24 RX ADMIN — SODIUM CHLORIDE, PRESERVATIVE FREE 10 ML: 5 INJECTION INTRAVENOUS at 08:41

## 2023-05-24 RX ADMIN — IPRATROPIUM BROMIDE AND ALBUTEROL SULFATE 3 ML: 2.5; .5 SOLUTION RESPIRATORY (INHALATION) at 18:46

## 2023-05-24 RX ADMIN — DOXYCYCLINE 100 MG: 100 INJECTION, POWDER, LYOPHILIZED, FOR SOLUTION INTRAVENOUS at 20:20

## 2023-05-24 RX ADMIN — TACROLIMUS 0.5 MG: 0.5 CAPSULE ORAL at 20:24

## 2023-05-24 RX ADMIN — GABAPENTIN 600 MG: 300 CAPSULE ORAL at 08:38

## 2023-05-24 RX ADMIN — ASPIRIN 81 MG: 81 TABLET, COATED ORAL at 08:38

## 2023-05-24 RX ADMIN — FUROSEMIDE 20 MG: 10 INJECTION, SOLUTION INTRAMUSCULAR; INTRAVENOUS at 09:58

## 2023-05-24 RX ADMIN — METOPROLOL TARTRATE 75 MG: 50 TABLET, FILM COATED ORAL at 12:37

## 2023-05-24 RX ADMIN — MONTELUKAST SODIUM 10 MG: 10 TABLET, FILM COATED ORAL at 20:23

## 2023-05-24 RX ADMIN — INSULIN LISPRO 2 UNITS: 100 INJECTION, SOLUTION INTRAVENOUS; SUBCUTANEOUS at 17:14

## 2023-05-24 RX ADMIN — DIGOXIN 250 MCG: 0.25 INJECTION INTRAMUSCULAR; INTRAVENOUS at 17:10

## 2023-05-24 RX ADMIN — METOPROLOL TARTRATE 50 MG: 50 TABLET, FILM COATED ORAL at 05:40

## 2023-05-24 RX ADMIN — GABAPENTIN 600 MG: 300 CAPSULE ORAL at 20:22

## 2023-05-24 RX ADMIN — POTASSIUM CHLORIDE 40 MEQ: 1500 TABLET, EXTENDED RELEASE ORAL at 14:41

## 2023-05-24 RX ADMIN — LEVOTHYROXINE SODIUM 25 MCG: 0.03 TABLET ORAL at 05:40

## 2023-05-24 RX ADMIN — INSULIN LISPRO 2 UNITS: 100 INJECTION, SOLUTION INTRAVENOUS; SUBCUTANEOUS at 12:37

## 2023-05-24 RX ADMIN — TACROLIMUS 1 MG: 1 CAPSULE ORAL at 08:38

## 2023-05-24 RX ADMIN — DOXYCYCLINE 100 MG: 100 INJECTION, POWDER, LYOPHILIZED, FOR SOLUTION INTRAVENOUS at 08:34

## 2023-05-24 RX ADMIN — ALBUTEROL SULFATE 2.5 MG: 2.5 SOLUTION RESPIRATORY (INHALATION) at 07:55

## 2023-05-24 RX ADMIN — PANTOPRAZOLE SODIUM 40 MG: 40 TABLET, DELAYED RELEASE ORAL at 21:43

## 2023-05-24 RX ADMIN — MAGNESIUM SULFATE HEPTAHYDRATE 2 G: 40 INJECTION, SOLUTION INTRAVENOUS at 08:38

## 2023-05-24 RX ADMIN — IPRATROPIUM BROMIDE AND ALBUTEROL SULFATE 3 ML: 2.5; .5 SOLUTION RESPIRATORY (INHALATION) at 15:21

## 2023-05-24 RX ADMIN — METOPROLOL TARTRATE 75 MG: 50 TABLET, FILM COATED ORAL at 17:56

## 2023-05-24 RX ADMIN — COLCHICINE 0.6 MG: 0.6 TABLET ORAL at 08:38

## 2023-05-24 RX ADMIN — DIGOXIN 250 MCG: 0.25 INJECTION INTRAMUSCULAR; INTRAVENOUS at 00:29

## 2023-05-24 ASSESSMENT — PAIN SCALES - GENERAL
PAINLEVEL_OUTOF10: 0

## 2023-05-25 LAB
ALBUMIN SERPL-MCNC: 2.9 G/DL (ref 3.6–5.1)
ALBUMIN/GLOB SERPL: 1 {RATIO} (ref 1–2.4)
ALP SERPL-CCNC: 78 UNITS/L (ref 45–117)
ALT SERPL-CCNC: 27 UNITS/L
ANION GAP SERPL CALC-SCNC: 14 MMOL/L (ref 7–19)
APTT PPP: 50 SEC (ref 22–30)
APTT PPP: 50 SEC (ref 22–30)
AST SERPL-CCNC: 29 UNITS/L
BASOPHILS # BLD: 0 K/MCL (ref 0–0.3)
BASOPHILS NFR BLD: 0 %
BILIRUB SERPL-MCNC: 0.9 MG/DL (ref 0.2–1)
BUN SERPL-MCNC: 19 MG/DL (ref 6–20)
BUN/CREAT SERPL: 30 (ref 7–25)
CALCIUM SERPL-MCNC: 8.6 MG/DL (ref 8.4–10.2)
CHLORIDE SERPL-SCNC: 104 MMOL/L (ref 97–110)
CO2 SERPL-SCNC: 28 MMOL/L (ref 21–32)
CREAT SERPL-MCNC: 0.63 MG/DL (ref 0.51–0.95)
DEPRECATED RDW RBC: 46.9 FL (ref 39–50)
EOSINOPHIL # BLD: 0.1 K/MCL (ref 0–0.5)
EOSINOPHIL NFR BLD: 1 %
ERYTHROCYTE [DISTWIDTH] IN BLOOD: 14.5 % (ref 11–15)
FASTING DURATION TIME PATIENT: ABNORMAL H
GFR SERPLBLD BASED ON 1.73 SQ M-ARVRAT: >90 ML/MIN
GLOBULIN SER-MCNC: 3 G/DL (ref 2–4)
GLUCOSE BLDC GLUCOMTR-MCNC: 110 MG/DL (ref 70–99)
GLUCOSE BLDC GLUCOMTR-MCNC: 190 MG/DL (ref 70–99)
GLUCOSE BLDC GLUCOMTR-MCNC: 207 MG/DL (ref 70–99)
GLUCOSE BLDC GLUCOMTR-MCNC: 99 MG/DL (ref 70–99)
GLUCOSE SERPL-MCNC: 98 MG/DL (ref 70–99)
HCT VFR BLD CALC: 42.3 % (ref 36–46.5)
HGB BLD-MCNC: 13.5 G/DL (ref 12–15.5)
IMM GRANULOCYTES # BLD AUTO: 0.2 K/MCL (ref 0–0.2)
IMM GRANULOCYTES # BLD: 2 %
LYMPHOCYTES # BLD: 1.3 K/MCL (ref 1–4)
LYMPHOCYTES NFR BLD: 12 %
MAGNESIUM SERPL-MCNC: 1.8 MG/DL (ref 1.7–2.4)
MCH RBC QN AUTO: 28.8 PG (ref 26–34)
MCHC RBC AUTO-ENTMCNC: 31.9 G/DL (ref 32–36.5)
MCV RBC AUTO: 90.4 FL (ref 78–100)
MONOCYTES # BLD: 0.9 K/MCL (ref 0.3–0.9)
MONOCYTES NFR BLD: 9 %
NEUTROPHILS # BLD: 8.4 K/MCL (ref 1.8–7.7)
NEUTROPHILS NFR BLD: 76 %
NRBC BLD MANUAL-RTO: 0 /100 WBC
PHOSPHATE SERPL-MCNC: 3.4 MG/DL (ref 2.4–4.7)
PLATELET # BLD AUTO: 225 K/MCL (ref 140–450)
POTASSIUM SERPL-SCNC: 4.7 MMOL/L (ref 3.4–5.1)
PROT SERPL-MCNC: 5.9 G/DL (ref 6.4–8.2)
RBC # BLD: 4.68 MIL/MCL (ref 4–5.2)
SODIUM SERPL-SCNC: 141 MMOL/L (ref 135–145)
TACROLIMUS BLD-MCNC: 4.5 NG/ML (ref 5–20)
TACROLIMUS BLD-MCNC: 5.7 NG/ML (ref 5–20)
WBC # BLD: 11 K/MCL (ref 4.2–11)

## 2023-05-25 PROCEDURE — 10006031 HB ROOM CHARGE TELEMETRY

## 2023-05-25 PROCEDURE — 10002800 HB RX 250 W HCPCS: Performed by: NURSE PRACTITIONER

## 2023-05-25 PROCEDURE — 10004651 HB RX, NO CHARGE ITEM: Performed by: EMERGENCY MEDICINE

## 2023-05-25 PROCEDURE — 80197 ASSAY OF TACROLIMUS: CPT | Performed by: EMERGENCY MEDICINE

## 2023-05-25 PROCEDURE — 94640 AIRWAY INHALATION TREATMENT: CPT

## 2023-05-25 PROCEDURE — 10002803 HB RX 637

## 2023-05-25 PROCEDURE — 10002801 HB RX 250 W/O HCPCS: Performed by: NURSE PRACTITIONER

## 2023-05-25 PROCEDURE — 10002800 HB RX 250 W HCPCS: Performed by: EMERGENCY MEDICINE

## 2023-05-25 PROCEDURE — 10002803 HB RX 637: Performed by: INTERNAL MEDICINE

## 2023-05-25 PROCEDURE — 80197 ASSAY OF TACROLIMUS: CPT | Performed by: NURSE PRACTITIONER

## 2023-05-25 PROCEDURE — 10002801 HB RX 250 W/O HCPCS: Performed by: EMERGENCY MEDICINE

## 2023-05-25 PROCEDURE — 10002803 HB RX 637: Performed by: EMERGENCY MEDICINE

## 2023-05-25 PROCEDURE — 80053 COMPREHEN METABOLIC PANEL: CPT | Performed by: EMERGENCY MEDICINE

## 2023-05-25 PROCEDURE — 10004180 HB COUNTER-TRANSPORT

## 2023-05-25 PROCEDURE — 83735 ASSAY OF MAGNESIUM: CPT | Performed by: EMERGENCY MEDICINE

## 2023-05-25 PROCEDURE — 85730 THROMBOPLASTIN TIME PARTIAL: CPT | Performed by: EMERGENCY MEDICINE

## 2023-05-25 PROCEDURE — 96372 THER/PROPH/DIAG INJ SC/IM: CPT | Performed by: EMERGENCY MEDICINE

## 2023-05-25 PROCEDURE — 85025 COMPLETE CBC W/AUTO DIFF WBC: CPT | Performed by: EMERGENCY MEDICINE

## 2023-05-25 PROCEDURE — 10002807 HB RX 258: Performed by: NURSE PRACTITIONER

## 2023-05-25 PROCEDURE — 10004651 HB RX, NO CHARGE ITEM: Performed by: NURSE PRACTITIONER

## 2023-05-25 PROCEDURE — 84100 ASSAY OF PHOSPHORUS: CPT | Performed by: EMERGENCY MEDICINE

## 2023-05-25 PROCEDURE — 10002807 HB RX 258: Performed by: EMERGENCY MEDICINE

## 2023-05-25 PROCEDURE — 13003292 HB HHF OXYGEN THERAPY DAILY

## 2023-05-25 PROCEDURE — 99233 SBSQ HOSP IP/OBS HIGH 50: CPT | Performed by: INTERNAL MEDICINE

## 2023-05-25 RX ORDER — FUROSEMIDE 40 MG/1
40 TABLET ORAL DAILY
Status: DISCONTINUED | OUTPATIENT
Start: 2023-05-26 | End: 2023-06-01 | Stop reason: HOSPADM

## 2023-05-25 RX ADMIN — APIXABAN 5 MG: 5 TABLET, FILM COATED ORAL at 12:28

## 2023-05-25 RX ADMIN — GABAPENTIN 600 MG: 300 CAPSULE ORAL at 21:08

## 2023-05-25 RX ADMIN — FUROSEMIDE 20 MG: 20 TABLET ORAL at 09:10

## 2023-05-25 RX ADMIN — DOXYCYCLINE 100 MG: 100 INJECTION, POWDER, LYOPHILIZED, FOR SOLUTION INTRAVENOUS at 21:50

## 2023-05-25 RX ADMIN — LEVOTHYROXINE SODIUM 25 MCG: 0.03 TABLET ORAL at 05:50

## 2023-05-25 RX ADMIN — ASPIRIN 81 MG: 81 TABLET, COATED ORAL at 09:10

## 2023-05-25 RX ADMIN — CEFEPIME 2000 MG: 2 INJECTION, POWDER, FOR SOLUTION INTRAVENOUS at 16:03

## 2023-05-25 RX ADMIN — IPRATROPIUM BROMIDE AND ALBUTEROL SULFATE 3 ML: 2.5; .5 SOLUTION RESPIRATORY (INHALATION) at 04:16

## 2023-05-25 RX ADMIN — DOXYCYCLINE 100 MG: 100 INJECTION, POWDER, LYOPHILIZED, FOR SOLUTION INTRAVENOUS at 09:13

## 2023-05-25 RX ADMIN — MAGNESIUM SULFATE HEPTAHYDRATE 2 G: 40 INJECTION, SOLUTION INTRAVENOUS at 12:44

## 2023-05-25 RX ADMIN — APIXABAN 5 MG: 5 TABLET, FILM COATED ORAL at 21:08

## 2023-05-25 RX ADMIN — HEPARIN SODIUM 26 UNITS/KG/HR: 10000 INJECTION, SOLUTION INTRAVENOUS at 01:13

## 2023-05-25 RX ADMIN — METOPROLOL TARTRATE 75 MG: 50 TABLET, FILM COATED ORAL at 05:50

## 2023-05-25 RX ADMIN — PREDNISONE 10 MG: 10 TABLET ORAL at 09:09

## 2023-05-25 RX ADMIN — COLCHICINE 0.6 MG: 0.6 TABLET ORAL at 09:10

## 2023-05-25 RX ADMIN — INSULIN LISPRO 4 UNITS: 100 INJECTION, SOLUTION INTRAVENOUS; SUBCUTANEOUS at 12:28

## 2023-05-25 RX ADMIN — IPRATROPIUM BROMIDE AND ALBUTEROL SULFATE 3 ML: 2.5; .5 SOLUTION RESPIRATORY (INHALATION) at 07:36

## 2023-05-25 RX ADMIN — TACROLIMUS 0.5 MG: 0.5 CAPSULE ORAL at 21:07

## 2023-05-25 RX ADMIN — NYSTATIN 500000 UNITS: 100000 SUSPENSION ORAL at 09:12

## 2023-05-25 RX ADMIN — SODIUM CHLORIDE, PRESERVATIVE FREE 2 ML: 5 INJECTION INTRAVENOUS at 09:21

## 2023-05-25 RX ADMIN — METOPROLOL TARTRATE 150 MG: 100 TABLET, FILM COATED ORAL at 21:20

## 2023-05-25 RX ADMIN — TACROLIMUS 0.5 MG: 0.5 CAPSULE ORAL at 09:10

## 2023-05-25 RX ADMIN — INSULIN LISPRO 2 UNITS: 100 INJECTION, SOLUTION INTRAVENOUS; SUBCUTANEOUS at 18:39

## 2023-05-25 RX ADMIN — IPRATROPIUM BROMIDE AND ALBUTEROL SULFATE 3 ML: 2.5; .5 SOLUTION RESPIRATORY (INHALATION) at 11:18

## 2023-05-25 RX ADMIN — PANTOPRAZOLE SODIUM 40 MG: 40 TABLET, DELAYED RELEASE ORAL at 21:08

## 2023-05-25 RX ADMIN — NYSTATIN 500000 UNITS: 100000 SUSPENSION ORAL at 15:58

## 2023-05-25 RX ADMIN — NYSTATIN 500000 UNITS: 100000 SUSPENSION ORAL at 21:08

## 2023-05-25 RX ADMIN — SODIUM CHLORIDE, PRESERVATIVE FREE 10 ML: 5 INJECTION INTRAVENOUS at 09:21

## 2023-05-25 RX ADMIN — GABAPENTIN 600 MG: 300 CAPSULE ORAL at 09:09

## 2023-05-25 RX ADMIN — IPRATROPIUM BROMIDE AND ALBUTEROL SULFATE 3 ML: 2.5; .5 SOLUTION RESPIRATORY (INHALATION) at 20:16

## 2023-05-25 RX ADMIN — IPRATROPIUM BROMIDE AND ALBUTEROL SULFATE 3 ML: 2.5; .5 SOLUTION RESPIRATORY (INHALATION) at 15:28

## 2023-05-25 RX ADMIN — SODIUM CHLORIDE, PRESERVATIVE FREE 2 ML: 5 INJECTION INTRAVENOUS at 21:56

## 2023-05-25 RX ADMIN — MONTELUKAST SODIUM 10 MG: 10 TABLET, FILM COATED ORAL at 21:07

## 2023-05-25 ASSESSMENT — PAIN SCALES - GENERAL
PAINLEVEL_OUTOF10: 0

## 2023-05-26 ENCOUNTER — APPOINTMENT (OUTPATIENT)
Dept: GENERAL RADIOLOGY | Age: 73
DRG: 871 | End: 2023-05-26

## 2023-05-26 LAB
ALBUMIN SERPL-MCNC: 2.7 G/DL (ref 3.6–5.1)
ALBUMIN/GLOB SERPL: 0.9 {RATIO} (ref 1–2.4)
ALP SERPL-CCNC: 82 UNITS/L (ref 45–117)
ALT SERPL-CCNC: 31 UNITS/L
ANION GAP SERPL CALC-SCNC: 13 MMOL/L (ref 7–19)
APTT PPP: 22 SEC (ref 22–30)
AST SERPL-CCNC: 29 UNITS/L
BACTERIA BLD CULT: NORMAL
BACTERIA BLD CULT: NORMAL
BASOPHILS # BLD: 0 K/MCL (ref 0–0.3)
BASOPHILS NFR BLD: 0 %
BILIRUB SERPL-MCNC: 0.8 MG/DL (ref 0.2–1)
BUN SERPL-MCNC: 28 MG/DL (ref 6–20)
BUN/CREAT SERPL: 33 (ref 7–25)
CALCIUM SERPL-MCNC: 8.5 MG/DL (ref 8.4–10.2)
CHLORIDE SERPL-SCNC: 105 MMOL/L (ref 97–110)
CO2 SERPL-SCNC: 27 MMOL/L (ref 21–32)
CREAT SERPL-MCNC: 0.85 MG/DL (ref 0.51–0.95)
DEPRECATED RDW RBC: 48.2 FL (ref 39–50)
EOSINOPHIL # BLD: 0.1 K/MCL (ref 0–0.5)
EOSINOPHIL NFR BLD: 1 %
ERYTHROCYTE [DISTWIDTH] IN BLOOD: 14.6 % (ref 11–15)
FASTING DURATION TIME PATIENT: ABNORMAL H
GFR SERPLBLD BASED ON 1.73 SQ M-ARVRAT: 73 ML/MIN
GLOBULIN SER-MCNC: 2.9 G/DL (ref 2–4)
GLUCOSE BLDC GLUCOMTR-MCNC: 102 MG/DL (ref 70–99)
GLUCOSE BLDC GLUCOMTR-MCNC: 124 MG/DL (ref 70–99)
GLUCOSE BLDC GLUCOMTR-MCNC: 133 MG/DL (ref 70–99)
GLUCOSE BLDC GLUCOMTR-MCNC: 153 MG/DL (ref 70–99)
GLUCOSE SERPL-MCNC: 100 MG/DL (ref 70–99)
HCT VFR BLD CALC: 40.9 % (ref 36–46.5)
HGB BLD-MCNC: 13.1 G/DL (ref 12–15.5)
IMM GRANULOCYTES # BLD AUTO: 0.3 K/MCL (ref 0–0.2)
IMM GRANULOCYTES # BLD: 2 %
LYMPHOCYTES # BLD: 1.4 K/MCL (ref 1–4)
LYMPHOCYTES NFR BLD: 11 %
MAGNESIUM SERPL-MCNC: 2 MG/DL (ref 1.7–2.4)
MCH RBC QN AUTO: 29 PG (ref 26–34)
MCHC RBC AUTO-ENTMCNC: 32 G/DL (ref 32–36.5)
MCV RBC AUTO: 90.7 FL (ref 78–100)
MONOCYTES # BLD: 0.9 K/MCL (ref 0.3–0.9)
MONOCYTES NFR BLD: 7 %
NEUTROPHILS # BLD: 10.1 K/MCL (ref 1.8–7.7)
NEUTROPHILS NFR BLD: 79 %
NRBC BLD MANUAL-RTO: 0 /100 WBC
PHOSPHATE SERPL-MCNC: 5 MG/DL (ref 2.4–4.7)
PLATELET # BLD AUTO: 308 K/MCL (ref 140–450)
POTASSIUM SERPL-SCNC: 4.3 MMOL/L (ref 3.4–5.1)
PROT SERPL-MCNC: 5.6 G/DL (ref 6.4–8.2)
RAINBOW EXTRA TUBES HOLD SPECIMEN: NORMAL
RBC # BLD: 4.51 MIL/MCL (ref 4–5.2)
SODIUM SERPL-SCNC: 141 MMOL/L (ref 135–145)
TACROLIMUS BLD-MCNC: 2.8 NG/ML (ref 5–20)
WBC # BLD: 12.8 K/MCL (ref 4.2–11)

## 2023-05-26 PROCEDURE — 10002803 HB RX 637: Performed by: NURSE PRACTITIONER

## 2023-05-26 PROCEDURE — 10002800 HB RX 250 W HCPCS: Performed by: NURSE PRACTITIONER

## 2023-05-26 PROCEDURE — 10002803 HB RX 637: Performed by: INTERNAL MEDICINE

## 2023-05-26 PROCEDURE — 83735 ASSAY OF MAGNESIUM: CPT | Performed by: NURSE PRACTITIONER

## 2023-05-26 PROCEDURE — 10002803 HB RX 637

## 2023-05-26 PROCEDURE — 71045 X-RAY EXAM CHEST 1 VIEW: CPT

## 2023-05-26 PROCEDURE — 10002801 HB RX 250 W/O HCPCS: Performed by: NURSE PRACTITIONER

## 2023-05-26 PROCEDURE — 99233 SBSQ HOSP IP/OBS HIGH 50: CPT | Performed by: INTERNAL MEDICINE

## 2023-05-26 PROCEDURE — 80053 COMPREHEN METABOLIC PANEL: CPT | Performed by: NURSE PRACTITIONER

## 2023-05-26 PROCEDURE — 10002803 HB RX 637: Performed by: EMERGENCY MEDICINE

## 2023-05-26 PROCEDURE — 85025 COMPLETE CBC W/AUTO DIFF WBC: CPT | Performed by: NURSE PRACTITIONER

## 2023-05-26 PROCEDURE — 13003292 HB HHF OXYGEN THERAPY DAILY

## 2023-05-26 PROCEDURE — 94640 AIRWAY INHALATION TREATMENT: CPT

## 2023-05-26 PROCEDURE — 10002801 HB RX 250 W/O HCPCS: Performed by: EMERGENCY MEDICINE

## 2023-05-26 PROCEDURE — 84100 ASSAY OF PHOSPHORUS: CPT | Performed by: NURSE PRACTITIONER

## 2023-05-26 PROCEDURE — 10002807 HB RX 258: Performed by: EMERGENCY MEDICINE

## 2023-05-26 PROCEDURE — 10006031 HB ROOM CHARGE TELEMETRY

## 2023-05-26 PROCEDURE — 10002807 HB RX 258: Performed by: NURSE PRACTITIONER

## 2023-05-26 PROCEDURE — 10004651 HB RX, NO CHARGE ITEM: Performed by: EMERGENCY MEDICINE

## 2023-05-26 PROCEDURE — 80197 ASSAY OF TACROLIMUS: CPT | Performed by: EMERGENCY MEDICINE

## 2023-05-26 PROCEDURE — 10004651 HB RX, NO CHARGE ITEM: Performed by: NURSE PRACTITIONER

## 2023-05-26 PROCEDURE — 36415 COLL VENOUS BLD VENIPUNCTURE: CPT | Performed by: EMERGENCY MEDICINE

## 2023-05-26 PROCEDURE — 85730 THROMBOPLASTIN TIME PARTIAL: CPT | Performed by: EMERGENCY MEDICINE

## 2023-05-26 PROCEDURE — 10004180 HB COUNTER-TRANSPORT

## 2023-05-26 RX ORDER — METOPROLOL TARTRATE 100 MG/1
100 TABLET ORAL EVERY 12 HOURS SCHEDULED
Status: DISCONTINUED | OUTPATIENT
Start: 2023-05-26 | End: 2023-06-01 | Stop reason: HOSPADM

## 2023-05-26 RX ADMIN — IPRATROPIUM BROMIDE AND ALBUTEROL SULFATE 3 ML: 2.5; .5 SOLUTION RESPIRATORY (INHALATION) at 00:13

## 2023-05-26 RX ADMIN — SODIUM CHLORIDE, PRESERVATIVE FREE 2 ML: 5 INJECTION INTRAVENOUS at 20:21

## 2023-05-26 RX ADMIN — IPRATROPIUM BROMIDE AND ALBUTEROL SULFATE 3 ML: 2.5; .5 SOLUTION RESPIRATORY (INHALATION) at 19:23

## 2023-05-26 RX ADMIN — ACETAMINOPHEN 650 MG: 325 TABLET ORAL at 17:03

## 2023-05-26 RX ADMIN — DOXYCYCLINE 100 MG: 100 INJECTION, POWDER, LYOPHILIZED, FOR SOLUTION INTRAVENOUS at 09:48

## 2023-05-26 RX ADMIN — IPRATROPIUM BROMIDE AND ALBUTEROL SULFATE 3 ML: 2.5; .5 SOLUTION RESPIRATORY (INHALATION) at 07:48

## 2023-05-26 RX ADMIN — TACROLIMUS 0.5 MG: 0.5 CAPSULE ORAL at 20:20

## 2023-05-26 RX ADMIN — IPRATROPIUM BROMIDE AND ALBUTEROL SULFATE 3 ML: 2.5; .5 SOLUTION RESPIRATORY (INHALATION) at 15:00

## 2023-05-26 RX ADMIN — DOXYCYCLINE 100 MG: 100 INJECTION, POWDER, LYOPHILIZED, FOR SOLUTION INTRAVENOUS at 20:33

## 2023-05-26 RX ADMIN — GABAPENTIN 600 MG: 300 CAPSULE ORAL at 20:20

## 2023-05-26 RX ADMIN — PREDNISONE 10 MG: 10 TABLET ORAL at 09:39

## 2023-05-26 RX ADMIN — COLCHICINE 0.6 MG: 0.6 TABLET ORAL at 09:38

## 2023-05-26 RX ADMIN — APIXABAN 5 MG: 5 TABLET, FILM COATED ORAL at 09:38

## 2023-05-26 RX ADMIN — APIXABAN 5 MG: 5 TABLET, FILM COATED ORAL at 20:20

## 2023-05-26 RX ADMIN — FUROSEMIDE 40 MG: 40 TABLET ORAL at 09:39

## 2023-05-26 RX ADMIN — SODIUM CHLORIDE, PRESERVATIVE FREE 10 ML: 5 INJECTION INTRAVENOUS at 20:21

## 2023-05-26 RX ADMIN — IPRATROPIUM BROMIDE AND ALBUTEROL SULFATE 3 ML: 2.5; .5 SOLUTION RESPIRATORY (INHALATION) at 03:39

## 2023-05-26 RX ADMIN — ASPIRIN 81 MG: 81 TABLET, COATED ORAL at 09:38

## 2023-05-26 RX ADMIN — METOPROLOL TARTRATE 150 MG: 100 TABLET, FILM COATED ORAL at 09:38

## 2023-05-26 RX ADMIN — GABAPENTIN 600 MG: 300 CAPSULE ORAL at 09:38

## 2023-05-26 RX ADMIN — LEVOTHYROXINE SODIUM 25 MCG: 0.03 TABLET ORAL at 05:53

## 2023-05-26 RX ADMIN — IPRATROPIUM BROMIDE AND ALBUTEROL SULFATE 3 ML: 2.5; .5 SOLUTION RESPIRATORY (INHALATION) at 23:09

## 2023-05-26 RX ADMIN — MONTELUKAST SODIUM 10 MG: 10 TABLET, FILM COATED ORAL at 20:20

## 2023-05-26 RX ADMIN — CEFEPIME 2000 MG: 2 INJECTION, POWDER, FOR SOLUTION INTRAVENOUS at 15:14

## 2023-05-26 RX ADMIN — TACROLIMUS 0.5 MG: 0.5 CAPSULE ORAL at 09:38

## 2023-05-26 RX ADMIN — IPRATROPIUM BROMIDE AND ALBUTEROL SULFATE 3 ML: 2.5; .5 SOLUTION RESPIRATORY (INHALATION) at 11:46

## 2023-05-26 RX ADMIN — PANTOPRAZOLE SODIUM 40 MG: 40 TABLET, DELAYED RELEASE ORAL at 20:20

## 2023-05-26 ASSESSMENT — PAIN SCALES - GENERAL
PAINLEVEL_OUTOF10: 0
PAINLEVEL_OUTOF10: 0
PAINLEVEL_OUTOF10: 10
PAINLEVEL_OUTOF10: 0
PAINLEVEL_OUTOF10: 0

## 2023-05-27 LAB
ALBUMIN SERPL-MCNC: 2.7 G/DL (ref 3.6–5.1)
ALBUMIN/GLOB SERPL: 0.9 {RATIO} (ref 1–2.4)
ALP SERPL-CCNC: 87 UNITS/L (ref 45–117)
ALT SERPL-CCNC: 34 UNITS/L
ANION GAP SERPL CALC-SCNC: 13 MMOL/L (ref 7–19)
APTT PPP: 23 SEC (ref 22–30)
AST SERPL-CCNC: 32 UNITS/L
BASOPHILS # BLD: 0 K/MCL (ref 0–0.3)
BASOPHILS NFR BLD: 0 %
BILIRUB SERPL-MCNC: 0.7 MG/DL (ref 0.2–1)
BUN SERPL-MCNC: 30 MG/DL (ref 6–20)
BUN/CREAT SERPL: 38 (ref 7–25)
CALCIUM SERPL-MCNC: 8.5 MG/DL (ref 8.4–10.2)
CHLORIDE SERPL-SCNC: 107 MMOL/L (ref 97–110)
CO2 SERPL-SCNC: 27 MMOL/L (ref 21–32)
CREAT SERPL-MCNC: 0.79 MG/DL (ref 0.51–0.95)
DEPRECATED RDW RBC: 49.8 FL (ref 39–50)
EOSINOPHIL # BLD: 0.1 K/MCL (ref 0–0.5)
EOSINOPHIL NFR BLD: 1 %
ERYTHROCYTE [DISTWIDTH] IN BLOOD: 14.6 % (ref 11–15)
FASTING DURATION TIME PATIENT: ABNORMAL H
GFR SERPLBLD BASED ON 1.73 SQ M-ARVRAT: 79 ML/MIN
GLOBULIN SER-MCNC: 2.9 G/DL (ref 2–4)
GLUCOSE BLDC GLUCOMTR-MCNC: 113 MG/DL (ref 70–99)
GLUCOSE BLDC GLUCOMTR-MCNC: 135 MG/DL (ref 70–99)
GLUCOSE BLDC GLUCOMTR-MCNC: 142 MG/DL (ref 70–99)
GLUCOSE BLDC GLUCOMTR-MCNC: 162 MG/DL (ref 70–99)
GLUCOSE SERPL-MCNC: 95 MG/DL (ref 70–99)
HCT VFR BLD CALC: 40.5 % (ref 36–46.5)
HGB BLD-MCNC: 12.4 G/DL (ref 12–15.5)
IMM GRANULOCYTES # BLD AUTO: 0.3 K/MCL (ref 0–0.2)
IMM GRANULOCYTES # BLD: 2 %
LYMPHOCYTES # BLD: 1.5 K/MCL (ref 1–4)
LYMPHOCYTES NFR BLD: 12 %
MAGNESIUM SERPL-MCNC: 1.9 MG/DL (ref 1.7–2.4)
MCH RBC QN AUTO: 28.6 PG (ref 26–34)
MCHC RBC AUTO-ENTMCNC: 30.6 G/DL (ref 32–36.5)
MCV RBC AUTO: 93.3 FL (ref 78–100)
MONOCYTES # BLD: 1 K/MCL (ref 0.3–0.9)
MONOCYTES NFR BLD: 8 %
NEUTROPHILS # BLD: 9.9 K/MCL (ref 1.8–7.7)
NEUTROPHILS NFR BLD: 77 %
NRBC BLD MANUAL-RTO: 0 /100 WBC
PHOSPHATE SERPL-MCNC: 4.8 MG/DL (ref 2.4–4.7)
PLATELET # BLD AUTO: 335 K/MCL (ref 140–450)
POTASSIUM SERPL-SCNC: 4.3 MMOL/L (ref 3.4–5.1)
PROT SERPL-MCNC: 5.6 G/DL (ref 6.4–8.2)
RBC # BLD: 4.34 MIL/MCL (ref 4–5.2)
SODIUM SERPL-SCNC: 143 MMOL/L (ref 135–145)
TACROLIMUS BLD-MCNC: 4.2 NG/ML (ref 5–20)
WBC # BLD: 12.8 K/MCL (ref 4.2–11)

## 2023-05-27 PROCEDURE — 10002800 HB RX 250 W HCPCS: Performed by: NURSE PRACTITIONER

## 2023-05-27 PROCEDURE — 10002803 HB RX 637: Performed by: EMERGENCY MEDICINE

## 2023-05-27 PROCEDURE — 10004180 HB COUNTER-TRANSPORT

## 2023-05-27 PROCEDURE — 10002803 HB RX 637: Performed by: INTERNAL MEDICINE

## 2023-05-27 PROCEDURE — 10002801 HB RX 250 W/O HCPCS: Performed by: NURSE PRACTITIONER

## 2023-05-27 PROCEDURE — 84100 ASSAY OF PHOSPHORUS: CPT | Performed by: NURSE PRACTITIONER

## 2023-05-27 PROCEDURE — 94640 AIRWAY INHALATION TREATMENT: CPT

## 2023-05-27 PROCEDURE — 10004651 HB RX, NO CHARGE ITEM: Performed by: NURSE PRACTITIONER

## 2023-05-27 PROCEDURE — 85025 COMPLETE CBC W/AUTO DIFF WBC: CPT | Performed by: NURSE PRACTITIONER

## 2023-05-27 PROCEDURE — 10002801 HB RX 250 W/O HCPCS: Performed by: EMERGENCY MEDICINE

## 2023-05-27 PROCEDURE — 13003292 HB HHF OXYGEN THERAPY DAILY

## 2023-05-27 PROCEDURE — 83735 ASSAY OF MAGNESIUM: CPT | Performed by: NURSE PRACTITIONER

## 2023-05-27 PROCEDURE — 10004651 HB RX, NO CHARGE ITEM: Performed by: EMERGENCY MEDICINE

## 2023-05-27 PROCEDURE — 36415 COLL VENOUS BLD VENIPUNCTURE: CPT | Performed by: NURSE PRACTITIONER

## 2023-05-27 PROCEDURE — 87040 BLOOD CULTURE FOR BACTERIA: CPT | Performed by: INTERNAL MEDICINE

## 2023-05-27 PROCEDURE — 80197 ASSAY OF TACROLIMUS: CPT | Performed by: EMERGENCY MEDICINE

## 2023-05-27 PROCEDURE — 10002803 HB RX 637: Performed by: NURSE PRACTITIONER

## 2023-05-27 PROCEDURE — 10002807 HB RX 258: Performed by: EMERGENCY MEDICINE

## 2023-05-27 PROCEDURE — 10006031 HB ROOM CHARGE TELEMETRY

## 2023-05-27 PROCEDURE — 80053 COMPREHEN METABOLIC PANEL: CPT | Performed by: NURSE PRACTITIONER

## 2023-05-27 PROCEDURE — 85730 THROMBOPLASTIN TIME PARTIAL: CPT | Performed by: EMERGENCY MEDICINE

## 2023-05-27 PROCEDURE — 10002807 HB RX 258: Performed by: NURSE PRACTITIONER

## 2023-05-27 PROCEDURE — 10002803 HB RX 637

## 2023-05-27 RX ADMIN — APIXABAN 5 MG: 5 TABLET, FILM COATED ORAL at 20:20

## 2023-05-27 RX ADMIN — SODIUM CHLORIDE, PRESERVATIVE FREE 2 ML: 5 INJECTION INTRAVENOUS at 20:24

## 2023-05-27 RX ADMIN — COLCHICINE 0.6 MG: 0.6 TABLET ORAL at 09:04

## 2023-05-27 RX ADMIN — TACROLIMUS 0.5 MG: 0.5 CAPSULE ORAL at 09:04

## 2023-05-27 RX ADMIN — METOPROLOL TARTRATE 100 MG: 100 TABLET, FILM COATED ORAL at 20:20

## 2023-05-27 RX ADMIN — IPRATROPIUM BROMIDE AND ALBUTEROL SULFATE 3 ML: 2.5; .5 SOLUTION RESPIRATORY (INHALATION) at 07:14

## 2023-05-27 RX ADMIN — IPRATROPIUM BROMIDE AND ALBUTEROL SULFATE 3 ML: 2.5; .5 SOLUTION RESPIRATORY (INHALATION) at 03:17

## 2023-05-27 RX ADMIN — DOXYCYCLINE 100 MG: 100 INJECTION, POWDER, LYOPHILIZED, FOR SOLUTION INTRAVENOUS at 09:12

## 2023-05-27 RX ADMIN — PREDNISONE 10 MG: 10 TABLET ORAL at 09:04

## 2023-05-27 RX ADMIN — SODIUM CHLORIDE, PRESERVATIVE FREE 2 ML: 5 INJECTION INTRAVENOUS at 09:04

## 2023-05-27 RX ADMIN — LEVOTHYROXINE SODIUM 25 MCG: 0.03 TABLET ORAL at 05:45

## 2023-05-27 RX ADMIN — SODIUM CHLORIDE, PRESERVATIVE FREE 10 ML: 5 INJECTION INTRAVENOUS at 20:24

## 2023-05-27 RX ADMIN — CEFEPIME 2000 MG: 2 INJECTION, POWDER, FOR SOLUTION INTRAVENOUS at 13:30

## 2023-05-27 RX ADMIN — APIXABAN 5 MG: 5 TABLET, FILM COATED ORAL at 09:04

## 2023-05-27 RX ADMIN — IPRATROPIUM BROMIDE AND ALBUTEROL SULFATE 3 ML: 2.5; .5 SOLUTION RESPIRATORY (INHALATION) at 15:13

## 2023-05-27 RX ADMIN — METOPROLOL TARTRATE 100 MG: 100 TABLET, FILM COATED ORAL at 09:04

## 2023-05-27 RX ADMIN — PANTOPRAZOLE SODIUM 40 MG: 40 TABLET, DELAYED RELEASE ORAL at 20:20

## 2023-05-27 RX ADMIN — FUROSEMIDE 40 MG: 40 TABLET ORAL at 09:04

## 2023-05-27 RX ADMIN — IPRATROPIUM BROMIDE AND ALBUTEROL SULFATE 3 ML: 2.5; .5 SOLUTION RESPIRATORY (INHALATION) at 11:08

## 2023-05-27 RX ADMIN — TACROLIMUS 0.5 MG: 0.5 CAPSULE ORAL at 20:23

## 2023-05-27 RX ADMIN — DOXYCYCLINE 100 MG: 100 INJECTION, POWDER, LYOPHILIZED, FOR SOLUTION INTRAVENOUS at 20:29

## 2023-05-27 RX ADMIN — MONTELUKAST SODIUM 10 MG: 10 TABLET, FILM COATED ORAL at 20:20

## 2023-05-27 RX ADMIN — GABAPENTIN 600 MG: 300 CAPSULE ORAL at 20:19

## 2023-05-27 RX ADMIN — GABAPENTIN 600 MG: 300 CAPSULE ORAL at 09:04

## 2023-05-27 RX ADMIN — SODIUM CHLORIDE, PRESERVATIVE FREE 10 ML: 5 INJECTION INTRAVENOUS at 09:05

## 2023-05-27 ASSESSMENT — PAIN SCALES - GENERAL
PAINLEVEL_OUTOF10: 2
PAINLEVEL_OUTOF10: 2

## 2023-05-28 ENCOUNTER — APPOINTMENT (OUTPATIENT)
Dept: GENERAL RADIOLOGY | Age: 73
DRG: 871 | End: 2023-05-28
Attending: INTERNAL MEDICINE

## 2023-05-28 LAB
ALBUMIN SERPL-MCNC: 2.5 G/DL (ref 3.6–5.1)
ALBUMIN/GLOB SERPL: 0.9 {RATIO} (ref 1–2.4)
ALP SERPL-CCNC: 93 UNITS/L (ref 45–117)
ALT SERPL-CCNC: 42 UNITS/L
ANION GAP SERPL CALC-SCNC: 14 MMOL/L (ref 7–19)
APTT PPP: 23 SEC (ref 22–30)
AST SERPL-CCNC: 40 UNITS/L
BASOPHILS # BLD: 0.1 K/MCL (ref 0–0.3)
BASOPHILS NFR BLD: 0 %
BILIRUB SERPL-MCNC: 0.9 MG/DL (ref 0.2–1)
BUN SERPL-MCNC: 27 MG/DL (ref 6–20)
BUN/CREAT SERPL: 36 (ref 7–25)
CALCIUM SERPL-MCNC: 8.7 MG/DL (ref 8.4–10.2)
CHLORIDE SERPL-SCNC: 106 MMOL/L (ref 97–110)
CO2 SERPL-SCNC: 27 MMOL/L (ref 21–32)
CREAT SERPL-MCNC: 0.75 MG/DL (ref 0.51–0.95)
DEPRECATED RDW RBC: 51.4 FL (ref 39–50)
EOSINOPHIL # BLD: 0.1 K/MCL (ref 0–0.5)
EOSINOPHIL NFR BLD: 1 %
ERYTHROCYTE [DISTWIDTH] IN BLOOD: 15 % (ref 11–15)
FASTING DURATION TIME PATIENT: ABNORMAL H
GFR SERPLBLD BASED ON 1.73 SQ M-ARVRAT: 85 ML/MIN
GLOBULIN SER-MCNC: 2.9 G/DL (ref 2–4)
GLUCOSE BLDC GLUCOMTR-MCNC: 109 MG/DL (ref 70–99)
GLUCOSE BLDC GLUCOMTR-MCNC: 116 MG/DL (ref 70–99)
GLUCOSE BLDC GLUCOMTR-MCNC: 132 MG/DL (ref 70–99)
GLUCOSE BLDC GLUCOMTR-MCNC: 168 MG/DL (ref 70–99)
GLUCOSE SERPL-MCNC: 104 MG/DL (ref 70–99)
HCT VFR BLD CALC: 38.6 % (ref 36–46.5)
HGB BLD-MCNC: 12.1 G/DL (ref 12–15.5)
IMM GRANULOCYTES # BLD AUTO: 0.3 K/MCL (ref 0–0.2)
IMM GRANULOCYTES # BLD: 2 %
LYMPHOCYTES # BLD: 1.6 K/MCL (ref 1–4)
LYMPHOCYTES NFR BLD: 10 %
MAGNESIUM SERPL-MCNC: 1.9 MG/DL (ref 1.7–2.4)
MCH RBC QN AUTO: 29.6 PG (ref 26–34)
MCHC RBC AUTO-ENTMCNC: 31.3 G/DL (ref 32–36.5)
MCV RBC AUTO: 94.4 FL (ref 78–100)
MONOCYTES # BLD: 1.4 K/MCL (ref 0.3–0.9)
MONOCYTES NFR BLD: 9 %
NEUTROPHILS # BLD: 12.5 K/MCL (ref 1.8–7.7)
NEUTROPHILS NFR BLD: 78 %
NRBC BLD MANUAL-RTO: 0 /100 WBC
PHOSPHATE SERPL-MCNC: 2.8 MG/DL (ref 2.4–4.7)
PLATELET # BLD AUTO: 343 K/MCL (ref 140–450)
POTASSIUM SERPL-SCNC: 4.7 MMOL/L (ref 3.4–5.1)
PROT SERPL-MCNC: 5.4 G/DL (ref 6.4–8.2)
RAINBOW EXTRA TUBES HOLD SPECIMEN: NORMAL
RBC # BLD: 4.09 MIL/MCL (ref 4–5.2)
SODIUM SERPL-SCNC: 142 MMOL/L (ref 135–145)
TACROLIMUS BLD-MCNC: 2.1 NG/ML (ref 5–20)
WBC # BLD: 16 K/MCL (ref 4.2–11)

## 2023-05-28 PROCEDURE — 10002803 HB RX 637: Performed by: INTERNAL MEDICINE

## 2023-05-28 PROCEDURE — 10004180 HB COUNTER-TRANSPORT

## 2023-05-28 PROCEDURE — 85730 THROMBOPLASTIN TIME PARTIAL: CPT | Performed by: EMERGENCY MEDICINE

## 2023-05-28 PROCEDURE — 10002801 HB RX 250 W/O HCPCS: Performed by: NURSE PRACTITIONER

## 2023-05-28 PROCEDURE — 71046 X-RAY EXAM CHEST 2 VIEWS: CPT

## 2023-05-28 PROCEDURE — 83735 ASSAY OF MAGNESIUM: CPT | Performed by: NURSE PRACTITIONER

## 2023-05-28 PROCEDURE — 85025 COMPLETE CBC W/AUTO DIFF WBC: CPT | Performed by: NURSE PRACTITIONER

## 2023-05-28 PROCEDURE — 10002801 HB RX 250 W/O HCPCS: Performed by: EMERGENCY MEDICINE

## 2023-05-28 PROCEDURE — 10002803 HB RX 637: Performed by: EMERGENCY MEDICINE

## 2023-05-28 PROCEDURE — 10002803 HB RX 637: Performed by: NURSE PRACTITIONER

## 2023-05-28 PROCEDURE — 80053 COMPREHEN METABOLIC PANEL: CPT | Performed by: NURSE PRACTITIONER

## 2023-05-28 PROCEDURE — 10006031 HB ROOM CHARGE TELEMETRY

## 2023-05-28 PROCEDURE — 94640 AIRWAY INHALATION TREATMENT: CPT

## 2023-05-28 PROCEDURE — 80197 ASSAY OF TACROLIMUS: CPT | Performed by: EMERGENCY MEDICINE

## 2023-05-28 PROCEDURE — 10004651 HB RX, NO CHARGE ITEM: Performed by: NURSE PRACTITIONER

## 2023-05-28 PROCEDURE — 36415 COLL VENOUS BLD VENIPUNCTURE: CPT | Performed by: EMERGENCY MEDICINE

## 2023-05-28 PROCEDURE — 10002807 HB RX 258: Performed by: EMERGENCY MEDICINE

## 2023-05-28 PROCEDURE — 84100 ASSAY OF PHOSPHORUS: CPT | Performed by: NURSE PRACTITIONER

## 2023-05-28 PROCEDURE — 10002803 HB RX 637

## 2023-05-28 RX ADMIN — IPRATROPIUM BROMIDE AND ALBUTEROL SULFATE 3 ML: 2.5; .5 SOLUTION RESPIRATORY (INHALATION) at 15:26

## 2023-05-28 RX ADMIN — PANTOPRAZOLE SODIUM 40 MG: 40 TABLET, DELAYED RELEASE ORAL at 20:26

## 2023-05-28 RX ADMIN — SODIUM CHLORIDE, PRESERVATIVE FREE 2 ML: 5 INJECTION INTRAVENOUS at 09:01

## 2023-05-28 RX ADMIN — APIXABAN 5 MG: 5 TABLET, FILM COATED ORAL at 20:26

## 2023-05-28 RX ADMIN — METOPROLOL TARTRATE 100 MG: 100 TABLET, FILM COATED ORAL at 20:25

## 2023-05-28 RX ADMIN — IPRATROPIUM BROMIDE AND ALBUTEROL SULFATE 3 ML: 2.5; .5 SOLUTION RESPIRATORY (INHALATION) at 06:55

## 2023-05-28 RX ADMIN — GABAPENTIN 600 MG: 300 CAPSULE ORAL at 09:01

## 2023-05-28 RX ADMIN — FUROSEMIDE 40 MG: 40 TABLET ORAL at 09:01

## 2023-05-28 RX ADMIN — MONTELUKAST SODIUM 10 MG: 10 TABLET, FILM COATED ORAL at 20:25

## 2023-05-28 RX ADMIN — COLCHICINE 0.6 MG: 0.6 TABLET ORAL at 09:01

## 2023-05-28 RX ADMIN — PREDNISONE 10 MG: 10 TABLET ORAL at 09:01

## 2023-05-28 RX ADMIN — LEVOTHYROXINE SODIUM 25 MCG: 0.03 TABLET ORAL at 05:48

## 2023-05-28 RX ADMIN — TACROLIMUS 0.5 MG: 0.5 CAPSULE ORAL at 20:25

## 2023-05-28 RX ADMIN — DOXYCYCLINE 100 MG: 100 INJECTION, POWDER, LYOPHILIZED, FOR SOLUTION INTRAVENOUS at 09:16

## 2023-05-28 RX ADMIN — GABAPENTIN 600 MG: 300 CAPSULE ORAL at 20:26

## 2023-05-28 RX ADMIN — TACROLIMUS 0.5 MG: 0.5 CAPSULE ORAL at 09:05

## 2023-05-28 RX ADMIN — APIXABAN 5 MG: 5 TABLET, FILM COATED ORAL at 09:01

## 2023-05-28 ASSESSMENT — PAIN SCALES - GENERAL
PAINLEVEL_OUTOF10: 2
PAINLEVEL_OUTOF10: 0

## 2023-05-29 LAB
ALBUMIN SERPL-MCNC: 2.7 G/DL (ref 3.6–5.1)
ALBUMIN/GLOB SERPL: 0.8 {RATIO} (ref 1–2.4)
ALP SERPL-CCNC: 105 UNITS/L (ref 45–117)
ALT SERPL-CCNC: 47 UNITS/L
ANION GAP SERPL CALC-SCNC: 12 MMOL/L (ref 7–19)
APTT PPP: 23 SEC (ref 22–30)
AST SERPL-CCNC: 38 UNITS/L
BASOPHILS # BLD: 0.1 K/MCL (ref 0–0.3)
BASOPHILS NFR BLD: 0 %
BILIRUB SERPL-MCNC: 1.2 MG/DL (ref 0.2–1)
BUN SERPL-MCNC: 30 MG/DL (ref 6–20)
BUN/CREAT SERPL: 33 (ref 7–25)
CALCIUM SERPL-MCNC: 8.9 MG/DL (ref 8.4–10.2)
CHLORIDE SERPL-SCNC: 102 MMOL/L (ref 97–110)
CO2 SERPL-SCNC: 30 MMOL/L (ref 21–32)
CREAT SERPL-MCNC: 0.9 MG/DL (ref 0.51–0.95)
DEPRECATED RDW RBC: 49.1 FL (ref 39–50)
EOSINOPHIL # BLD: 0.1 K/MCL (ref 0–0.5)
EOSINOPHIL NFR BLD: 0 %
ERYTHROCYTE [DISTWIDTH] IN BLOOD: 14.7 % (ref 11–15)
FASTING DURATION TIME PATIENT: ABNORMAL H
GFR SERPLBLD BASED ON 1.73 SQ M-ARVRAT: 68 ML/MIN
GLOBULIN SER-MCNC: 3.3 G/DL (ref 2–4)
GLUCOSE BLDC GLUCOMTR-MCNC: 111 MG/DL (ref 70–99)
GLUCOSE BLDC GLUCOMTR-MCNC: 136 MG/DL (ref 70–99)
GLUCOSE BLDC GLUCOMTR-MCNC: 202 MG/DL (ref 70–99)
GLUCOSE BLDC GLUCOMTR-MCNC: 94 MG/DL (ref 70–99)
GLUCOSE SERPL-MCNC: 101 MG/DL (ref 70–99)
HCT VFR BLD CALC: 40.7 % (ref 36–46.5)
HGB BLD-MCNC: 12.9 G/DL (ref 12–15.5)
IMM GRANULOCYTES # BLD AUTO: 0.3 K/MCL (ref 0–0.2)
IMM GRANULOCYTES # BLD: 2 %
LYMPHOCYTES # BLD: 1.8 K/MCL (ref 1–4)
LYMPHOCYTES NFR BLD: 12 %
MAGNESIUM SERPL-MCNC: 1.9 MG/DL (ref 1.7–2.4)
MCH RBC QN AUTO: 28.9 PG (ref 26–34)
MCHC RBC AUTO-ENTMCNC: 31.7 G/DL (ref 32–36.5)
MCV RBC AUTO: 91.3 FL (ref 78–100)
MONOCYTES # BLD: 1.3 K/MCL (ref 0.3–0.9)
MONOCYTES NFR BLD: 8 %
NEUTROPHILS # BLD: 11.7 K/MCL (ref 1.8–7.7)
NEUTROPHILS NFR BLD: 78 %
NRBC BLD MANUAL-RTO: 0 /100 WBC
PHOSPHATE SERPL-MCNC: 3.5 MG/DL (ref 2.4–4.7)
PLATELET # BLD AUTO: 350 K/MCL (ref 140–450)
POTASSIUM SERPL-SCNC: 4 MMOL/L (ref 3.4–5.1)
PROT SERPL-MCNC: 6 G/DL (ref 6.4–8.2)
RAINBOW EXTRA TUBES HOLD SPECIMEN: NORMAL
RBC # BLD: 4.46 MIL/MCL (ref 4–5.2)
SODIUM SERPL-SCNC: 140 MMOL/L (ref 135–145)
TACROLIMUS BLD-MCNC: 2.2 NG/ML (ref 5–20)
WBC # BLD: 15.1 K/MCL (ref 4.2–11)

## 2023-05-29 PROCEDURE — 96372 THER/PROPH/DIAG INJ SC/IM: CPT | Performed by: EMERGENCY MEDICINE

## 2023-05-29 PROCEDURE — 10002803 HB RX 637: Performed by: INTERNAL MEDICINE

## 2023-05-29 PROCEDURE — 10002801 HB RX 250 W/O HCPCS: Performed by: EMERGENCY MEDICINE

## 2023-05-29 PROCEDURE — 99222 1ST HOSP IP/OBS MODERATE 55: CPT | Performed by: INTERNAL MEDICINE

## 2023-05-29 PROCEDURE — 10002803 HB RX 637

## 2023-05-29 PROCEDURE — 13003288 HB SUPPLY HELIOX DAILY

## 2023-05-29 PROCEDURE — 97162 PT EVAL MOD COMPLEX 30 MIN: CPT

## 2023-05-29 PROCEDURE — 36415 COLL VENOUS BLD VENIPUNCTURE: CPT | Performed by: EMERGENCY MEDICINE

## 2023-05-29 PROCEDURE — 10006031 HB ROOM CHARGE TELEMETRY

## 2023-05-29 PROCEDURE — 10004180 HB COUNTER-TRANSPORT

## 2023-05-29 PROCEDURE — 10002801 HB RX 250 W/O HCPCS: Performed by: NURSE PRACTITIONER

## 2023-05-29 PROCEDURE — 84100 ASSAY OF PHOSPHORUS: CPT | Performed by: NURSE PRACTITIONER

## 2023-05-29 PROCEDURE — 94640 AIRWAY INHALATION TREATMENT: CPT

## 2023-05-29 PROCEDURE — 10002803 HB RX 637: Performed by: EMERGENCY MEDICINE

## 2023-05-29 PROCEDURE — 10002803 HB RX 637: Performed by: NURSE PRACTITIONER

## 2023-05-29 PROCEDURE — 85025 COMPLETE CBC W/AUTO DIFF WBC: CPT | Performed by: NURSE PRACTITIONER

## 2023-05-29 PROCEDURE — 10002800 HB RX 250 W HCPCS: Performed by: EMERGENCY MEDICINE

## 2023-05-29 PROCEDURE — 10004651 HB RX, NO CHARGE ITEM: Performed by: EMERGENCY MEDICINE

## 2023-05-29 PROCEDURE — 80197 ASSAY OF TACROLIMUS: CPT | Performed by: EMERGENCY MEDICINE

## 2023-05-29 PROCEDURE — 85730 THROMBOPLASTIN TIME PARTIAL: CPT | Performed by: EMERGENCY MEDICINE

## 2023-05-29 PROCEDURE — 80053 COMPREHEN METABOLIC PANEL: CPT | Performed by: NURSE PRACTITIONER

## 2023-05-29 PROCEDURE — 10004651 HB RX, NO CHARGE ITEM: Performed by: NURSE PRACTITIONER

## 2023-05-29 PROCEDURE — 83735 ASSAY OF MAGNESIUM: CPT | Performed by: NURSE PRACTITIONER

## 2023-05-29 PROCEDURE — 97530 THERAPEUTIC ACTIVITIES: CPT

## 2023-05-29 RX ORDER — DOXYCYCLINE HYCLATE 100 MG/1
100 CAPSULE ORAL EVERY 12 HOURS SCHEDULED
Status: DISCONTINUED | OUTPATIENT
Start: 2023-05-29 | End: 2023-06-01 | Stop reason: HOSPADM

## 2023-05-29 RX ORDER — POTASSIUM CHLORIDE 20 MEQ/1
40 TABLET, EXTENDED RELEASE ORAL ONCE
Status: COMPLETED | OUTPATIENT
Start: 2023-05-29 | End: 2023-05-29

## 2023-05-29 RX ORDER — CEFDINIR 300 MG/1
300 CAPSULE ORAL EVERY 12 HOURS SCHEDULED
Status: DISCONTINUED | OUTPATIENT
Start: 2023-05-29 | End: 2023-06-01 | Stop reason: HOSPADM

## 2023-05-29 RX ADMIN — IPRATROPIUM BROMIDE AND ALBUTEROL SULFATE 3 ML: 2.5; .5 SOLUTION RESPIRATORY (INHALATION) at 03:34

## 2023-05-29 RX ADMIN — DOXYCYCLINE 100 MG: 100 CAPSULE ORAL at 20:04

## 2023-05-29 RX ADMIN — INSULIN LISPRO 4 UNITS: 100 INJECTION, SOLUTION INTRAVENOUS; SUBCUTANEOUS at 17:27

## 2023-05-29 RX ADMIN — FUROSEMIDE 40 MG: 40 TABLET ORAL at 08:43

## 2023-05-29 RX ADMIN — CEFDINIR 300 MG: 300 CAPSULE ORAL at 08:42

## 2023-05-29 RX ADMIN — POTASSIUM CHLORIDE 40 MEQ: 1500 TABLET, EXTENDED RELEASE ORAL at 17:23

## 2023-05-29 RX ADMIN — LEVOTHYROXINE SODIUM 25 MCG: 0.03 TABLET ORAL at 05:35

## 2023-05-29 RX ADMIN — PREDNISONE 10 MG: 10 TABLET ORAL at 08:43

## 2023-05-29 RX ADMIN — DOXYCYCLINE 100 MG: 100 CAPSULE ORAL at 08:43

## 2023-05-29 RX ADMIN — PANTOPRAZOLE SODIUM 40 MG: 40 TABLET, DELAYED RELEASE ORAL at 20:05

## 2023-05-29 RX ADMIN — TACROLIMUS 0.5 MG: 0.5 CAPSULE ORAL at 08:43

## 2023-05-29 RX ADMIN — MONTELUKAST SODIUM 10 MG: 10 TABLET, FILM COATED ORAL at 20:04

## 2023-05-29 RX ADMIN — IPRATROPIUM BROMIDE AND ALBUTEROL SULFATE 3 ML: 2.5; .5 SOLUTION RESPIRATORY (INHALATION) at 19:34

## 2023-05-29 RX ADMIN — IPRATROPIUM BROMIDE AND ALBUTEROL SULFATE 3 ML: 2.5; .5 SOLUTION RESPIRATORY (INHALATION) at 23:16

## 2023-05-29 RX ADMIN — SODIUM CHLORIDE, PRESERVATIVE FREE 10 ML: 5 INJECTION INTRAVENOUS at 08:49

## 2023-05-29 RX ADMIN — GABAPENTIN 600 MG: 300 CAPSULE ORAL at 08:43

## 2023-05-29 RX ADMIN — SODIUM CHLORIDE, PRESERVATIVE FREE 2 ML: 5 INJECTION INTRAVENOUS at 20:08

## 2023-05-29 RX ADMIN — APIXABAN 5 MG: 5 TABLET, FILM COATED ORAL at 20:04

## 2023-05-29 RX ADMIN — METOPROLOL TARTRATE 100 MG: 100 TABLET, FILM COATED ORAL at 08:43

## 2023-05-29 RX ADMIN — COLCHICINE 0.6 MG: 0.6 TABLET ORAL at 08:43

## 2023-05-29 RX ADMIN — SODIUM CHLORIDE, PRESERVATIVE FREE 2 ML: 5 INJECTION INTRAVENOUS at 08:49

## 2023-05-29 RX ADMIN — IPRATROPIUM BROMIDE AND ALBUTEROL SULFATE 3 ML: 2.5; .5 SOLUTION RESPIRATORY (INHALATION) at 07:16

## 2023-05-29 RX ADMIN — METOPROLOL TARTRATE 100 MG: 100 TABLET, FILM COATED ORAL at 20:04

## 2023-05-29 RX ADMIN — GABAPENTIN 600 MG: 300 CAPSULE ORAL at 20:04

## 2023-05-29 RX ADMIN — TACROLIMUS 0.5 MG: 0.5 CAPSULE ORAL at 20:05

## 2023-05-29 RX ADMIN — CEFDINIR 300 MG: 300 CAPSULE ORAL at 20:04

## 2023-05-29 RX ADMIN — APIXABAN 5 MG: 5 TABLET, FILM COATED ORAL at 08:43

## 2023-05-29 ASSESSMENT — PAIN SCALES - GENERAL
PAINLEVEL_OUTOF10: 0

## 2023-05-29 ASSESSMENT — COGNITIVE AND FUNCTIONAL STATUS - GENERAL
BASIC_MOBILITY_CONVERTED_SCORE: 36.97
BASIC_MOBILITY_RAW_SCORE: 15

## 2023-05-30 LAB
ALBUMIN SERPL-MCNC: 2.6 G/DL (ref 3.6–5.1)
ALBUMIN/GLOB SERPL: 0.8 {RATIO} (ref 1–2.4)
ALP SERPL-CCNC: 102 UNITS/L (ref 45–117)
ALT SERPL-CCNC: 40 UNITS/L
ANION GAP SERPL CALC-SCNC: 16 MMOL/L (ref 7–19)
APTT PPP: 24 SEC (ref 22–30)
AST SERPL-CCNC: 28 UNITS/L
BASOPHILS # BLD: 0.1 K/MCL (ref 0–0.3)
BASOPHILS NFR BLD: 0 %
BILIRUB SERPL-MCNC: 1 MG/DL (ref 0.2–1)
BUN SERPL-MCNC: 35 MG/DL (ref 6–20)
BUN/CREAT SERPL: 40 (ref 7–25)
CALCIUM SERPL-MCNC: 9.1 MG/DL (ref 8.4–10.2)
CHLORIDE SERPL-SCNC: 102 MMOL/L (ref 97–110)
CO2 SERPL-SCNC: 27 MMOL/L (ref 21–32)
CREAT SERPL-MCNC: 0.88 MG/DL (ref 0.51–0.95)
DEPRECATED RDW RBC: 49.7 FL (ref 39–50)
EOSINOPHIL # BLD: 0.1 K/MCL (ref 0–0.5)
EOSINOPHIL NFR BLD: 0 %
ERYTHROCYTE [DISTWIDTH] IN BLOOD: 14.6 % (ref 11–15)
FASTING DURATION TIME PATIENT: ABNORMAL H
GFR SERPLBLD BASED ON 1.73 SQ M-ARVRAT: 70 ML/MIN
GLOBULIN SER-MCNC: 3.4 G/DL (ref 2–4)
GLUCOSE BLDC GLUCOMTR-MCNC: 113 MG/DL (ref 70–99)
GLUCOSE BLDC GLUCOMTR-MCNC: 138 MG/DL (ref 70–99)
GLUCOSE BLDC GLUCOMTR-MCNC: 149 MG/DL (ref 70–99)
GLUCOSE BLDC GLUCOMTR-MCNC: 91 MG/DL (ref 70–99)
GLUCOSE SERPL-MCNC: 90 MG/DL (ref 70–99)
HCT VFR BLD CALC: 36.9 % (ref 36–46.5)
HGB BLD-MCNC: 11.7 G/DL (ref 12–15.5)
IMM GRANULOCYTES # BLD AUTO: 0.2 K/MCL (ref 0–0.2)
IMM GRANULOCYTES # BLD: 2 %
LYMPHOCYTES # BLD: 2.1 K/MCL (ref 1–4)
LYMPHOCYTES NFR BLD: 15 %
MAGNESIUM SERPL-MCNC: 1.9 MG/DL (ref 1.7–2.4)
MCH RBC QN AUTO: 29.3 PG (ref 26–34)
MCHC RBC AUTO-ENTMCNC: 31.7 G/DL (ref 32–36.5)
MCV RBC AUTO: 92.5 FL (ref 78–100)
MONOCYTES # BLD: 1.3 K/MCL (ref 0.3–0.9)
MONOCYTES NFR BLD: 9 %
NEUTROPHILS # BLD: 10.6 K/MCL (ref 1.8–7.7)
NEUTROPHILS NFR BLD: 74 %
NRBC BLD MANUAL-RTO: 0 /100 WBC
PHOSPHATE SERPL-MCNC: 3.9 MG/DL (ref 2.4–4.7)
PLATELET # BLD AUTO: 360 K/MCL (ref 140–450)
POTASSIUM SERPL-SCNC: 4.4 MMOL/L (ref 3.4–5.1)
PROT SERPL-MCNC: 6 G/DL (ref 6.4–8.2)
RBC # BLD: 3.99 MIL/MCL (ref 4–5.2)
SODIUM SERPL-SCNC: 141 MMOL/L (ref 135–145)
TACROLIMUS BLD-MCNC: 2.7 NG/ML (ref 5–20)
WBC # BLD: 14.4 K/MCL (ref 4.2–11)

## 2023-05-30 PROCEDURE — 84100 ASSAY OF PHOSPHORUS: CPT | Performed by: NURSE PRACTITIONER

## 2023-05-30 PROCEDURE — 10002803 HB RX 637: Performed by: INTERNAL MEDICINE

## 2023-05-30 PROCEDURE — 97166 OT EVAL MOD COMPLEX 45 MIN: CPT

## 2023-05-30 PROCEDURE — 85730 THROMBOPLASTIN TIME PARTIAL: CPT | Performed by: EMERGENCY MEDICINE

## 2023-05-30 PROCEDURE — 99233 SBSQ HOSP IP/OBS HIGH 50: CPT | Performed by: INTERNAL MEDICINE

## 2023-05-30 PROCEDURE — 10002803 HB RX 637: Performed by: EMERGENCY MEDICINE

## 2023-05-30 PROCEDURE — 10002807 HB RX 258: Performed by: INTERNAL MEDICINE

## 2023-05-30 PROCEDURE — 10006031 HB ROOM CHARGE TELEMETRY

## 2023-05-30 PROCEDURE — 10002803 HB RX 637: Performed by: NURSE PRACTITIONER

## 2023-05-30 PROCEDURE — 85025 COMPLETE CBC W/AUTO DIFF WBC: CPT | Performed by: NURSE PRACTITIONER

## 2023-05-30 PROCEDURE — 10004651 HB RX, NO CHARGE ITEM: Performed by: EMERGENCY MEDICINE

## 2023-05-30 PROCEDURE — 10004180 HB COUNTER-TRANSPORT

## 2023-05-30 PROCEDURE — 80053 COMPREHEN METABOLIC PANEL: CPT | Performed by: NURSE PRACTITIONER

## 2023-05-30 PROCEDURE — 97535 SELF CARE MNGMENT TRAINING: CPT

## 2023-05-30 PROCEDURE — 80197 ASSAY OF TACROLIMUS: CPT | Performed by: EMERGENCY MEDICINE

## 2023-05-30 PROCEDURE — 94640 AIRWAY INHALATION TREATMENT: CPT

## 2023-05-30 PROCEDURE — 83735 ASSAY OF MAGNESIUM: CPT | Performed by: NURSE PRACTITIONER

## 2023-05-30 PROCEDURE — 10002803 HB RX 637

## 2023-05-30 PROCEDURE — 10002801 HB RX 250 W/O HCPCS: Performed by: EMERGENCY MEDICINE

## 2023-05-30 PROCEDURE — 10002801 HB RX 250 W/O HCPCS: Performed by: NURSE PRACTITIONER

## 2023-05-30 PROCEDURE — 36415 COLL VENOUS BLD VENIPUNCTURE: CPT | Performed by: NURSE PRACTITIONER

## 2023-05-30 PROCEDURE — 10004651 HB RX, NO CHARGE ITEM: Performed by: NURSE PRACTITIONER

## 2023-05-30 PROCEDURE — 13003289 HB OXYGEN THERAPY DAILY

## 2023-05-30 RX ORDER — SODIUM CHLORIDE 9 MG/ML
INJECTION, SOLUTION INTRAVENOUS CONTINUOUS
Status: DISCONTINUED | OUTPATIENT
Start: 2023-05-30 | End: 2023-06-01 | Stop reason: HOSPADM

## 2023-05-30 RX ORDER — GABAPENTIN 400 MG/1
1200 CAPSULE ORAL EVERY 12 HOURS SCHEDULED
Status: DISCONTINUED | OUTPATIENT
Start: 2023-05-30 | End: 2023-06-01 | Stop reason: HOSPADM

## 2023-05-30 RX ADMIN — PREDNISONE 10 MG: 10 TABLET ORAL at 08:14

## 2023-05-30 RX ADMIN — SODIUM CHLORIDE, PRESERVATIVE FREE 2 ML: 5 INJECTION INTRAVENOUS at 20:49

## 2023-05-30 RX ADMIN — FUROSEMIDE 40 MG: 40 TABLET ORAL at 08:14

## 2023-05-30 RX ADMIN — TACROLIMUS 0.5 MG: 0.5 CAPSULE ORAL at 20:40

## 2023-05-30 RX ADMIN — CEFDINIR 300 MG: 300 CAPSULE ORAL at 20:42

## 2023-05-30 RX ADMIN — METOPROLOL TARTRATE 100 MG: 100 TABLET, FILM COATED ORAL at 20:41

## 2023-05-30 RX ADMIN — COLCHICINE 0.6 MG: 0.6 TABLET ORAL at 08:14

## 2023-05-30 RX ADMIN — METOPROLOL TARTRATE 100 MG: 100 TABLET, FILM COATED ORAL at 08:14

## 2023-05-30 RX ADMIN — DOXYCYCLINE 100 MG: 100 CAPSULE ORAL at 08:14

## 2023-05-30 RX ADMIN — LEVOTHYROXINE SODIUM 25 MCG: 0.03 TABLET ORAL at 06:22

## 2023-05-30 RX ADMIN — TACROLIMUS 0.5 MG: 0.5 CAPSULE ORAL at 08:13

## 2023-05-30 RX ADMIN — IPRATROPIUM BROMIDE AND ALBUTEROL SULFATE 3 ML: 2.5; .5 SOLUTION RESPIRATORY (INHALATION) at 15:43

## 2023-05-30 RX ADMIN — GABAPENTIN 600 MG: 300 CAPSULE ORAL at 08:14

## 2023-05-30 RX ADMIN — GABAPENTIN 1200 MG: 400 CAPSULE ORAL at 20:40

## 2023-05-30 RX ADMIN — APIXABAN 5 MG: 5 TABLET, FILM COATED ORAL at 20:40

## 2023-05-30 RX ADMIN — MONTELUKAST SODIUM 10 MG: 10 TABLET, FILM COATED ORAL at 20:42

## 2023-05-30 RX ADMIN — APIXABAN 5 MG: 5 TABLET, FILM COATED ORAL at 08:14

## 2023-05-30 RX ADMIN — PANTOPRAZOLE SODIUM 40 MG: 40 TABLET, DELAYED RELEASE ORAL at 20:40

## 2023-05-30 RX ADMIN — CEFDINIR 300 MG: 300 CAPSULE ORAL at 08:14

## 2023-05-30 RX ADMIN — SODIUM CHLORIDE, PRESERVATIVE FREE 10 ML: 5 INJECTION INTRAVENOUS at 21:00

## 2023-05-30 RX ADMIN — SODIUM CHLORIDE: 9 INJECTION, SOLUTION INTRAVENOUS at 12:56

## 2023-05-30 RX ADMIN — DOXYCYCLINE 100 MG: 100 CAPSULE ORAL at 20:42

## 2023-05-30 RX ADMIN — IPRATROPIUM BROMIDE AND ALBUTEROL SULFATE 3 ML: 2.5; .5 SOLUTION RESPIRATORY (INHALATION) at 12:13

## 2023-05-30 ASSESSMENT — ENCOUNTER SYMPTOMS
WHEEZING: 0
ABDOMINAL PAIN: 0
HEADACHES: 0
EYES NEGATIVE: 1
GASTROINTESTINAL NEGATIVE: 1
HEMATOLOGIC/LYMPHATIC NEGATIVE: 1
CONFUSION: 0
ADENOPATHY: 0
SHORTNESS OF BREATH: 0
COUGH: 0
DIZZINESS: 0
VOMITING: 0
ABDOMINAL DISTENTION: 0
NEUROLOGICAL NEGATIVE: 1
STRIDOR: 0
WEAKNESS: 0
LIGHT-HEADEDNESS: 0
RESPIRATORY NEGATIVE: 1
PSYCHIATRIC NEGATIVE: 1
PAIN SEVERITY NOW: 7

## 2023-05-30 ASSESSMENT — COGNITIVE AND FUNCTIONAL STATUS - GENERAL
HELP NEEDED FOR TOILETING: A LITTLE
DAILY_ACTIVITY_CONVERTED_SCORE: 47.10
HELP NEEDED FOR BATHING: A LITTLE
DAILY_ACTIVITY_RAW_SCORE: 22

## 2023-05-30 ASSESSMENT — PAIN SCALES - GENERAL
PAINLEVEL_OUTOF10: 0

## 2023-05-30 ASSESSMENT — ACTIVITIES OF DAILY LIVING (ADL)
PRIOR_ADL: INDEPENDENT
HOME_MANAGEMENT_TIME_ENTRY: 10

## 2023-05-31 LAB
ALBUMIN SERPL-MCNC: 2.6 G/DL (ref 3.6–5.1)
ALBUMIN/GLOB SERPL: 0.8 {RATIO} (ref 1–2.4)
ALP SERPL-CCNC: 95 UNITS/L (ref 45–117)
ALT SERPL-CCNC: 31 UNITS/L
ANION GAP SERPL CALC-SCNC: 14 MMOL/L (ref 7–19)
APTT PPP: 22 SEC (ref 22–30)
AST SERPL-CCNC: 18 UNITS/L
BASOPHILS # BLD: 0 K/MCL (ref 0–0.3)
BASOPHILS NFR BLD: 0 %
BILIRUB SERPL-MCNC: 0.8 MG/DL (ref 0.2–1)
BUN SERPL-MCNC: 38 MG/DL (ref 6–20)
BUN/CREAT SERPL: 39 (ref 7–25)
CALCIUM SERPL-MCNC: 8.8 MG/DL (ref 8.4–10.2)
CHLORIDE SERPL-SCNC: 105 MMOL/L (ref 97–110)
CO2 SERPL-SCNC: 29 MMOL/L (ref 21–32)
CREAT SERPL-MCNC: 0.97 MG/DL (ref 0.51–0.95)
DEPRECATED RDW RBC: 48 FL (ref 39–50)
EOSINOPHIL # BLD: 0 K/MCL (ref 0–0.5)
EOSINOPHIL NFR BLD: 0 %
ERYTHROCYTE [DISTWIDTH] IN BLOOD: 14.6 % (ref 11–15)
FASTING DURATION TIME PATIENT: ABNORMAL H
GFR SERPLBLD BASED ON 1.73 SQ M-ARVRAT: 62 ML/MIN
GLOBULIN SER-MCNC: 3.3 G/DL (ref 2–4)
GLUCOSE BLDC GLUCOMTR-MCNC: 106 MG/DL (ref 70–99)
GLUCOSE BLDC GLUCOMTR-MCNC: 113 MG/DL (ref 70–99)
GLUCOSE BLDC GLUCOMTR-MCNC: 125 MG/DL (ref 70–99)
GLUCOSE BLDC GLUCOMTR-MCNC: 129 MG/DL (ref 70–99)
GLUCOSE SERPL-MCNC: 93 MG/DL (ref 70–99)
HCT VFR BLD CALC: 36.9 % (ref 36–46.5)
HGB BLD-MCNC: 11.9 G/DL (ref 12–15.5)
IMM GRANULOCYTES # BLD AUTO: 0.2 K/MCL (ref 0–0.2)
IMM GRANULOCYTES # BLD: 1 %
LYMPHOCYTES # BLD: 2.1 K/MCL (ref 1–4)
LYMPHOCYTES NFR BLD: 16 %
MAGNESIUM SERPL-MCNC: 1.8 MG/DL (ref 1.7–2.4)
MCH RBC QN AUTO: 29.2 PG (ref 26–34)
MCHC RBC AUTO-ENTMCNC: 32.2 G/DL (ref 32–36.5)
MCV RBC AUTO: 90.4 FL (ref 78–100)
MONOCYTES # BLD: 1.1 K/MCL (ref 0.3–0.9)
MONOCYTES NFR BLD: 9 %
NEUTROPHILS # BLD: 9.1 K/MCL (ref 1.8–7.7)
NEUTROPHILS NFR BLD: 74 %
NRBC BLD MANUAL-RTO: 0 /100 WBC
PHOSPHATE SERPL-MCNC: 3.8 MG/DL (ref 2.4–4.7)
PLATELET # BLD AUTO: 372 K/MCL (ref 140–450)
POTASSIUM SERPL-SCNC: 4.3 MMOL/L (ref 3.4–5.1)
PROT SERPL-MCNC: 5.9 G/DL (ref 6.4–8.2)
RBC # BLD: 4.08 MIL/MCL (ref 4–5.2)
SODIUM SERPL-SCNC: 144 MMOL/L (ref 135–145)
TACROLIMUS BLD-MCNC: 4.7 NG/ML (ref 5–20)
WBC # BLD: 12.5 K/MCL (ref 4.2–11)

## 2023-05-31 PROCEDURE — 10004180 HB COUNTER-TRANSPORT

## 2023-05-31 PROCEDURE — 80053 COMPREHEN METABOLIC PANEL: CPT | Performed by: NURSE PRACTITIONER

## 2023-05-31 PROCEDURE — 10002801 HB RX 250 W/O HCPCS: Performed by: NURSE PRACTITIONER

## 2023-05-31 PROCEDURE — 80197 ASSAY OF TACROLIMUS: CPT | Performed by: EMERGENCY MEDICINE

## 2023-05-31 PROCEDURE — 85025 COMPLETE CBC W/AUTO DIFF WBC: CPT | Performed by: NURSE PRACTITIONER

## 2023-05-31 PROCEDURE — 10004651 HB RX, NO CHARGE ITEM: Performed by: NURSE PRACTITIONER

## 2023-05-31 PROCEDURE — 10002803 HB RX 637

## 2023-05-31 PROCEDURE — 10002803 HB RX 637: Performed by: INTERNAL MEDICINE

## 2023-05-31 PROCEDURE — 85730 THROMBOPLASTIN TIME PARTIAL: CPT | Performed by: EMERGENCY MEDICINE

## 2023-05-31 PROCEDURE — 83735 ASSAY OF MAGNESIUM: CPT | Performed by: NURSE PRACTITIONER

## 2023-05-31 PROCEDURE — 10006031 HB ROOM CHARGE TELEMETRY

## 2023-05-31 PROCEDURE — 84100 ASSAY OF PHOSPHORUS: CPT | Performed by: NURSE PRACTITIONER

## 2023-05-31 PROCEDURE — 10004651 HB RX, NO CHARGE ITEM: Performed by: EMERGENCY MEDICINE

## 2023-05-31 PROCEDURE — 10002803 HB RX 637: Performed by: NURSE PRACTITIONER

## 2023-05-31 PROCEDURE — 10002803 HB RX 637: Performed by: EMERGENCY MEDICINE

## 2023-05-31 PROCEDURE — 36415 COLL VENOUS BLD VENIPUNCTURE: CPT | Performed by: NURSE PRACTITIONER

## 2023-05-31 RX ORDER — PREDNISONE 2.5 MG/1
7.5 TABLET ORAL
Qty: 60 TABLET | Refills: 0 | Status: SHIPPED | OUTPATIENT
Start: 2023-06-01

## 2023-05-31 RX ORDER — PREDNISONE 5 MG/1
7.5 TABLET ORAL
Status: DISCONTINUED | OUTPATIENT
Start: 2023-06-01 | End: 2023-06-01 | Stop reason: HOSPADM

## 2023-05-31 RX ADMIN — TACROLIMUS 0.5 MG: 0.5 CAPSULE ORAL at 21:33

## 2023-05-31 RX ADMIN — METOPROLOL TARTRATE 100 MG: 100 TABLET, FILM COATED ORAL at 08:04

## 2023-05-31 RX ADMIN — LEVOTHYROXINE SODIUM 25 MCG: 0.03 TABLET ORAL at 06:10

## 2023-05-31 RX ADMIN — DOXYCYCLINE 100 MG: 100 CAPSULE ORAL at 21:32

## 2023-05-31 RX ADMIN — SODIUM CHLORIDE, PRESERVATIVE FREE 10 ML: 5 INJECTION INTRAVENOUS at 21:36

## 2023-05-31 RX ADMIN — METOPROLOL TARTRATE 100 MG: 100 TABLET, FILM COATED ORAL at 21:33

## 2023-05-31 RX ADMIN — PANTOPRAZOLE SODIUM 40 MG: 40 TABLET, DELAYED RELEASE ORAL at 21:33

## 2023-05-31 RX ADMIN — GABAPENTIN 1200 MG: 400 CAPSULE ORAL at 08:03

## 2023-05-31 RX ADMIN — MONTELUKAST SODIUM 10 MG: 10 TABLET, FILM COATED ORAL at 21:32

## 2023-05-31 RX ADMIN — CEFDINIR 300 MG: 300 CAPSULE ORAL at 08:03

## 2023-05-31 RX ADMIN — PREDNISONE 10 MG: 10 TABLET ORAL at 08:04

## 2023-05-31 RX ADMIN — TACROLIMUS 0.5 MG: 0.5 CAPSULE ORAL at 08:07

## 2023-05-31 RX ADMIN — APIXABAN 5 MG: 5 TABLET, FILM COATED ORAL at 08:03

## 2023-05-31 RX ADMIN — FUROSEMIDE 40 MG: 40 TABLET ORAL at 08:02

## 2023-05-31 RX ADMIN — DOXYCYCLINE 100 MG: 100 CAPSULE ORAL at 08:03

## 2023-05-31 RX ADMIN — APIXABAN 5 MG: 5 TABLET, FILM COATED ORAL at 21:32

## 2023-05-31 RX ADMIN — SODIUM CHLORIDE, PRESERVATIVE FREE 2 ML: 5 INJECTION INTRAVENOUS at 21:36

## 2023-05-31 RX ADMIN — COLCHICINE 0.6 MG: 0.6 TABLET ORAL at 08:03

## 2023-05-31 RX ADMIN — CEFDINIR 300 MG: 300 CAPSULE ORAL at 21:31

## 2023-05-31 RX ADMIN — GABAPENTIN 1200 MG: 400 CAPSULE ORAL at 21:32

## 2023-05-31 ASSESSMENT — PAIN SCALES - GENERAL
PAINLEVEL_OUTOF10: 0

## 2023-06-01 VITALS
HEART RATE: 53 BPM | SYSTOLIC BLOOD PRESSURE: 106 MMHG | HEIGHT: 62 IN | OXYGEN SATURATION: 92 % | BODY MASS INDEX: 25.8 KG/M2 | WEIGHT: 140.21 LBS | TEMPERATURE: 97.9 F | RESPIRATION RATE: 18 BRPM | DIASTOLIC BLOOD PRESSURE: 62 MMHG

## 2023-06-01 LAB
ALBUMIN SERPL-MCNC: 2.6 G/DL (ref 3.6–5.1)
ALBUMIN/GLOB SERPL: 0.8 {RATIO} (ref 1–2.4)
ALP SERPL-CCNC: 89 UNITS/L (ref 45–117)
ALT SERPL-CCNC: 27 UNITS/L
ANION GAP SERPL CALC-SCNC: 16 MMOL/L (ref 7–19)
APTT PPP: 22 SEC (ref 22–30)
AST SERPL-CCNC: 18 UNITS/L
BACTERIA BLD CULT: NORMAL
BACTERIA BLD CULT: NORMAL
BASOPHILS # BLD: 0.1 K/MCL (ref 0–0.3)
BASOPHILS NFR BLD: 1 %
BILIRUB SERPL-MCNC: 0.7 MG/DL (ref 0.2–1)
BUN SERPL-MCNC: 36 MG/DL (ref 6–20)
BUN/CREAT SERPL: 40 (ref 7–25)
CALCIUM SERPL-MCNC: 8.6 MG/DL (ref 8.4–10.2)
CHLORIDE SERPL-SCNC: 107 MMOL/L (ref 97–110)
CO2 SERPL-SCNC: 26 MMOL/L (ref 21–32)
CREAT SERPL-MCNC: 0.91 MG/DL (ref 0.51–0.95)
DEPRECATED RDW RBC: 49 FL (ref 39–50)
EOSINOPHIL # BLD: 0 K/MCL (ref 0–0.5)
EOSINOPHIL NFR BLD: 0 %
ERYTHROCYTE [DISTWIDTH] IN BLOOD: 14.6 % (ref 11–15)
FASTING DURATION TIME PATIENT: ABNORMAL H
GFR SERPLBLD BASED ON 1.73 SQ M-ARVRAT: 67 ML/MIN
GLOBULIN SER-MCNC: 3.1 G/DL (ref 2–4)
GLUCOSE BLDC GLUCOMTR-MCNC: 114 MG/DL (ref 70–99)
GLUCOSE BLDC GLUCOMTR-MCNC: 92 MG/DL (ref 70–99)
GLUCOSE SERPL-MCNC: 94 MG/DL (ref 70–99)
HCT VFR BLD CALC: 36.9 % (ref 36–46.5)
HGB BLD-MCNC: 11.7 G/DL (ref 12–15.5)
IMM GRANULOCYTES # BLD AUTO: 0.1 K/MCL (ref 0–0.2)
IMM GRANULOCYTES # BLD: 1 %
LYMPHOCYTES # BLD: 2 K/MCL (ref 1–4)
LYMPHOCYTES NFR BLD: 17 %
MAGNESIUM SERPL-MCNC: 1.7 MG/DL (ref 1.7–2.4)
MCH RBC QN AUTO: 29 PG (ref 26–34)
MCHC RBC AUTO-ENTMCNC: 31.7 G/DL (ref 32–36.5)
MCV RBC AUTO: 91.6 FL (ref 78–100)
MONOCYTES # BLD: 0.9 K/MCL (ref 0.3–0.9)
MONOCYTES NFR BLD: 8 %
NEUTROPHILS # BLD: 8.4 K/MCL (ref 1.8–7.7)
NEUTROPHILS NFR BLD: 73 %
NRBC BLD MANUAL-RTO: 0 /100 WBC
PHOSPHATE SERPL-MCNC: 3.6 MG/DL (ref 2.4–4.7)
PLATELET # BLD AUTO: 360 K/MCL (ref 140–450)
POTASSIUM SERPL-SCNC: 3.9 MMOL/L (ref 3.4–5.1)
PROT SERPL-MCNC: 5.7 G/DL (ref 6.4–8.2)
RBC # BLD: 4.03 MIL/MCL (ref 4–5.2)
SARS-COV-2 RNA RESP QL NAA+PROBE: NOT DETECTED
SERVICE CMNT-IMP: NORMAL
SERVICE CMNT-IMP: NORMAL
SODIUM SERPL-SCNC: 145 MMOL/L (ref 135–145)
TACROLIMUS BLD-MCNC: 4.6 NG/ML (ref 5–20)
WBC # BLD: 11.6 K/MCL (ref 4.2–11)

## 2023-06-01 PROCEDURE — 36415 COLL VENOUS BLD VENIPUNCTURE: CPT | Performed by: NURSE PRACTITIONER

## 2023-06-01 PROCEDURE — 83735 ASSAY OF MAGNESIUM: CPT | Performed by: NURSE PRACTITIONER

## 2023-06-01 PROCEDURE — 85025 COMPLETE CBC W/AUTO DIFF WBC: CPT | Performed by: NURSE PRACTITIONER

## 2023-06-01 PROCEDURE — 85730 THROMBOPLASTIN TIME PARTIAL: CPT | Performed by: EMERGENCY MEDICINE

## 2023-06-01 PROCEDURE — 80053 COMPREHEN METABOLIC PANEL: CPT | Performed by: NURSE PRACTITIONER

## 2023-06-01 PROCEDURE — 10002803 HB RX 637: Performed by: INTERNAL MEDICINE

## 2023-06-01 PROCEDURE — 10002803 HB RX 637: Performed by: NURSE PRACTITIONER

## 2023-06-01 PROCEDURE — 10002803 HB RX 637

## 2023-06-01 PROCEDURE — 84100 ASSAY OF PHOSPHORUS: CPT | Performed by: NURSE PRACTITIONER

## 2023-06-01 PROCEDURE — 10002803 HB RX 637: Performed by: EMERGENCY MEDICINE

## 2023-06-01 PROCEDURE — 80197 ASSAY OF TACROLIMUS: CPT | Performed by: EMERGENCY MEDICINE

## 2023-06-01 PROCEDURE — 87635 SARS-COV-2 COVID-19 AMP PRB: CPT | Performed by: INTERNAL MEDICINE

## 2023-06-01 RX ORDER — PANTOPRAZOLE SODIUM 40 MG/1
40 TABLET, DELAYED RELEASE ORAL NIGHTLY
Status: SHIPPED | COMMUNITY
Start: 2023-06-01

## 2023-06-01 RX ORDER — POTASSIUM CHLORIDE 20 MEQ/1
40 TABLET, EXTENDED RELEASE ORAL ONCE
Status: DISCONTINUED | OUTPATIENT
Start: 2023-06-01 | End: 2023-06-01 | Stop reason: HOSPADM

## 2023-06-01 RX ORDER — CEFDINIR 300 MG/1
300 CAPSULE ORAL EVERY 12 HOURS SCHEDULED
Status: SHIPPED | COMMUNITY
Start: 2023-06-01 | End: 2023-06-01 | Stop reason: HOSPADM

## 2023-06-01 RX ORDER — METOPROLOL TARTRATE 100 MG/1
100 TABLET ORAL EVERY 12 HOURS SCHEDULED
Status: SHIPPED | COMMUNITY
Start: 2023-06-01

## 2023-06-01 RX ADMIN — APIXABAN 5 MG: 5 TABLET, FILM COATED ORAL at 09:40

## 2023-06-01 RX ADMIN — GABAPENTIN 1200 MG: 400 CAPSULE ORAL at 09:41

## 2023-06-01 RX ADMIN — PREDNISONE 7.5 MG: 5 TABLET ORAL at 09:41

## 2023-06-01 RX ADMIN — COLCHICINE 0.6 MG: 0.6 TABLET ORAL at 09:41

## 2023-06-01 RX ADMIN — TACROLIMUS 0.5 MG: 0.5 CAPSULE ORAL at 09:41

## 2023-06-01 RX ADMIN — LEVOTHYROXINE SODIUM 25 MCG: 0.03 TABLET ORAL at 05:36

## 2023-06-01 RX ADMIN — CEFDINIR 300 MG: 300 CAPSULE ORAL at 09:41

## 2023-06-01 RX ADMIN — DOXYCYCLINE 100 MG: 100 CAPSULE ORAL at 09:41

## 2023-06-01 ASSESSMENT — PAIN SCALES - GENERAL: PAINLEVEL_OUTOF10: 0

## 2024-06-05 ENCOUNTER — HOSPITAL ENCOUNTER (INPATIENT)
Age: 74
LOS: 1 days | Discharge: HOME HEALTH | DRG: 698 | End: 2024-06-07
Attending: EMERGENCY MEDICINE | Admitting: INTERNAL MEDICINE

## 2024-06-05 ENCOUNTER — APPOINTMENT (OUTPATIENT)
Dept: GENERAL RADIOLOGY | Age: 74
DRG: 698 | End: 2024-06-05
Attending: EMERGENCY MEDICINE

## 2024-06-05 ENCOUNTER — APPOINTMENT (OUTPATIENT)
Dept: CT IMAGING | Age: 74
DRG: 698 | End: 2024-06-05
Attending: EMERGENCY MEDICINE

## 2024-06-05 DIAGNOSIS — J44.1 COPD EXACERBATION  (CMD): ICD-10-CM

## 2024-06-05 DIAGNOSIS — J96.01 ACUTE RESPIRATORY FAILURE WITH HYPOXIA  (CMD): ICD-10-CM

## 2024-06-05 DIAGNOSIS — I50.9 ACUTE ON CHRONIC CONGESTIVE HEART FAILURE, UNSPECIFIED HEART FAILURE TYPE  (CMD): Primary | ICD-10-CM

## 2024-06-05 DIAGNOSIS — J43.2 CENTRILOBULAR EMPHYSEMA  (CMD): ICD-10-CM

## 2024-06-05 LAB
ALBUMIN SERPL-MCNC: 2.8 G/DL (ref 3.6–5.1)
ALBUMIN/GLOB SERPL: 1.2 {RATIO} (ref 1–2.4)
ALP SERPL-CCNC: 68 UNITS/L (ref 45–117)
ALT SERPL-CCNC: 18 UNITS/L
ANION GAP SERPL CALC-SCNC: 11 MMOL/L (ref 7–19)
AST SERPL-CCNC: 12 UNITS/L
BASOPHILS # BLD: 0 K/MCL (ref 0–0.3)
BASOPHILS NFR BLD: 0 %
BILIRUB SERPL-MCNC: 0.9 MG/DL (ref 0.2–1)
BUN SERPL-MCNC: 17 MG/DL (ref 6–20)
BUN/CREAT SERPL: 17 (ref 7–25)
CALCIUM SERPL-MCNC: 7.4 MG/DL (ref 8.4–10.2)
CHLORIDE SERPL-SCNC: 101 MMOL/L (ref 97–110)
CO2 SERPL-SCNC: 29 MMOL/L (ref 21–32)
CREAT SERPL-MCNC: 0.98 MG/DL (ref 0.51–0.95)
DEPRECATED RDW RBC: 49.8 FL (ref 39–50)
EGFRCR SERPLBLD CKD-EPI 2021: 61 ML/MIN/{1.73_M2}
EOSINOPHIL # BLD: 0 K/MCL (ref 0–0.5)
EOSINOPHIL NFR BLD: 0 %
ERYTHROCYTE [DISTWIDTH] IN BLOOD: 14.1 % (ref 11–15)
FASTING DURATION TIME PATIENT: ABNORMAL H
GLOBULIN SER-MCNC: 2.4 G/DL (ref 2–4)
GLUCOSE SERPL-MCNC: 92 MG/DL (ref 70–99)
HCT VFR BLD CALC: 39.3 % (ref 36–46.5)
HGB BLD-MCNC: 13.1 G/DL (ref 12–15.5)
IMM GRANULOCYTES # BLD AUTO: 0.1 K/MCL (ref 0–0.2)
IMM GRANULOCYTES # BLD: 1 %
LYMPHOCYTES # BLD: 1.6 K/MCL (ref 1–4)
LYMPHOCYTES NFR BLD: 15 %
MCH RBC QN AUTO: 32.1 PG (ref 26–34)
MCHC RBC AUTO-ENTMCNC: 33.3 G/DL (ref 32–36.5)
MCV RBC AUTO: 96.3 FL (ref 78–100)
MONOCYTES # BLD: 1.3 K/MCL (ref 0.3–0.9)
MONOCYTES NFR BLD: 12 %
NEUTROPHILS # BLD: 7.5 K/MCL (ref 1.8–7.7)
NEUTROPHILS NFR BLD: 72 %
NRBC BLD MANUAL-RTO: 0 /100 WBC
NT-PROBNP SERPL-MCNC: 2084 PG/ML
PLATELET # BLD AUTO: 151 K/MCL (ref 140–450)
POTASSIUM SERPL-SCNC: 3.7 MMOL/L (ref 3.4–5.1)
PROT SERPL-MCNC: 5.2 G/DL (ref 6.4–8.2)
RBC # BLD: 4.08 MIL/MCL (ref 4–5.2)
SODIUM SERPL-SCNC: 137 MMOL/L (ref 135–145)
WBC # BLD: 10.4 K/MCL (ref 4.2–11)

## 2024-06-05 PROCEDURE — 0241U COVID/FLU/RSV PANEL: CPT | Performed by: EMERGENCY MEDICINE

## 2024-06-05 PROCEDURE — 99291 CRITICAL CARE FIRST HOUR: CPT

## 2024-06-05 PROCEDURE — 96360 HYDRATION IV INFUSION INIT: CPT

## 2024-06-05 PROCEDURE — 80053 COMPREHEN METABOLIC PANEL: CPT | Performed by: EMERGENCY MEDICINE

## 2024-06-05 PROCEDURE — 72125 CT NECK SPINE W/O DYE: CPT

## 2024-06-05 PROCEDURE — 10002807 HB RX 258: Performed by: EMERGENCY MEDICINE

## 2024-06-05 PROCEDURE — 70450 CT HEAD/BRAIN W/O DYE: CPT

## 2024-06-05 PROCEDURE — 83880 ASSAY OF NATRIURETIC PEPTIDE: CPT | Performed by: EMERGENCY MEDICINE

## 2024-06-05 PROCEDURE — 85025 COMPLETE CBC W/AUTO DIFF WBC: CPT | Performed by: EMERGENCY MEDICINE

## 2024-06-05 PROCEDURE — 71045 X-RAY EXAM CHEST 1 VIEW: CPT

## 2024-06-05 PROCEDURE — 93005 ELECTROCARDIOGRAM TRACING: CPT | Performed by: EMERGENCY MEDICINE

## 2024-06-05 RX ORDER — IPRATROPIUM BROMIDE AND ALBUTEROL SULFATE 2.5; .5 MG/3ML; MG/3ML
3 SOLUTION RESPIRATORY (INHALATION) ONCE
Status: COMPLETED | OUTPATIENT
Start: 2024-06-05 | End: 2024-06-06

## 2024-06-05 RX ADMIN — SODIUM CHLORIDE 1000 ML: 9 INJECTION, SOLUTION INTRAVENOUS at 22:42

## 2024-06-06 ENCOUNTER — APPOINTMENT (OUTPATIENT)
Dept: CARDIOLOGY | Age: 74
DRG: 698 | End: 2024-06-06
Attending: NURSE PRACTITIONER

## 2024-06-06 PROBLEM — I48.0 PAROXYSMAL ATRIAL FIBRILLATION  (CMD): Status: ACTIVE | Noted: 2024-06-06

## 2024-06-06 PROBLEM — J44.1 COPD EXACERBATION  (CMD): Status: ACTIVE | Noted: 2024-06-06

## 2024-06-06 PROBLEM — I50.9 ACUTE ON CHRONIC CONGESTIVE HEART FAILURE, UNSPECIFIED HEART FAILURE TYPE  (CMD): Status: ACTIVE | Noted: 2024-06-06

## 2024-06-06 PROBLEM — N18.2 CHRONIC KIDNEY DISEASE, STAGE II (MILD): Status: ACTIVE | Noted: 2024-06-06

## 2024-06-06 PROBLEM — D84.9 IMMUNOSUPPRESSED STATUS  (CMD): Status: ACTIVE | Noted: 2024-06-06

## 2024-06-06 PROBLEM — E83.42 HYPOMAGNESEMIA: Status: ACTIVE | Noted: 2024-06-06

## 2024-06-06 PROBLEM — I50.32 CHRONIC DIASTOLIC HEART FAILURE  (CMD): Status: ACTIVE | Noted: 2024-06-06

## 2024-06-06 PROBLEM — I50.9 ACUTE ON CHRONIC CONGESTIVE HEART FAILURE, UNSPECIFIED HEART FAILURE TYPE  (CMD): Status: RESOLVED | Noted: 2024-06-06 | Resolved: 2024-06-06

## 2024-06-06 PROBLEM — J96.01 ACUTE RESPIRATORY FAILURE WITH HYPOXIA  (CMD): Status: ACTIVE | Noted: 2024-06-06

## 2024-06-06 PROBLEM — N17.9 ACUTE KIDNEY INJURY (CMD): Status: ACTIVE | Noted: 2024-06-06

## 2024-06-06 LAB
ANION GAP SERPL CALC-SCNC: 13 MMOL/L (ref 7–19)
AORTIC VALVE AREA (AVA): 0.75
AORTIC VALVE AREA: 2.48
ASCENDING AORTA (AAD): 8
AV MEAN GRADIENT (AVMG): 4
AV MEAN VELOCITY (AVMV): 0.94
AV PEAK GRADIENT (AVPG): 7
AV PEAK VELOCITY (AVPV): 1.3
AV STENOSIS SEVERITY TEXT: NORMAL
AVI LVOT PEAK GRADIENT (LVOTMG): 0.9
BASOPHILS # BLD: 0 K/MCL (ref 0–0.3)
BASOPHILS NFR BLD: 0 %
BUN SERPL-MCNC: 17 MG/DL (ref 6–20)
BUN/CREAT SERPL: 16 (ref 7–25)
CALCIUM SERPL-MCNC: 8.2 MG/DL (ref 8.4–10.2)
CHLORIDE SERPL-SCNC: 101 MMOL/L (ref 97–110)
CO2 SERPL-SCNC: 26 MMOL/L (ref 21–32)
CREAT SERPL-MCNC: 1.05 MG/DL (ref 0.51–0.95)
DEPRECATED RDW RBC: 49.2 FL (ref 39–50)
E WAVE DECELARATION TIME (MDT): 15.61
EGFRCR SERPLBLD CKD-EPI 2021: 56 ML/MIN/{1.73_M2}
EOSINOPHIL # BLD: 0 K/MCL (ref 0–0.5)
EOSINOPHIL NFR BLD: 0 %
ERYTHROCYTE [DISTWIDTH] IN BLOOD: 13.7 % (ref 11–15)
FASTING DURATION TIME PATIENT: ABNORMAL H
FLUAV RNA RESP QL NAA+PROBE: NOT DETECTED
FLUBV RNA RESP QL NAA+PROBE: NOT DETECTED
GLUCOSE SERPL-MCNC: 114 MG/DL (ref 70–99)
HCT VFR BLD CALC: 41.6 % (ref 36–46.5)
HGB BLD-MCNC: 13.5 G/DL (ref 12–15.5)
IMM GRANULOCYTES # BLD AUTO: 0.1 K/MCL (ref 0–0.2)
IMM GRANULOCYTES # BLD: 1 %
INTERVENTRICULAR SEPTUM IN END DIASTOLE (IVSD): 2.54
LEFT INTERNAL DIMENSION IN SYSTOLE (LVSD): 0.9
LEFT VENTRICULAR INTERNAL DIMENSION IN DIASTOLE (LVDD): 2.8
LEFT VENTRICULAR POSTERIOR WALL IN END DIASTOLE (LVPW): 4.4
LV EF: NORMAL %
LVOT 2D (LVOTD): 28.3
LVOT VTI (LVOTVTI): 1.02
LYMPHOCYTES # BLD: 0.6 K/MCL (ref 1–4)
LYMPHOCYTES NFR BLD: 6 %
MAGNESIUM SERPL-MCNC: 1.5 MG/DL (ref 1.7–2.4)
MCH RBC QN AUTO: 31.2 PG (ref 26–34)
MCHC RBC AUTO-ENTMCNC: 32.5 G/DL (ref 32–36.5)
MCV RBC AUTO: 96.1 FL (ref 78–100)
MONOCYTES # BLD: 0.4 K/MCL (ref 0.3–0.9)
MONOCYTES NFR BLD: 4 %
MV E TISSUE VEL LAT (MELV): 1.1
MV E TISSUE VEL MED (MESV): 6.09
MV E WAVE VEL/E TISSUE VEL MED(MSR): 4.78
MV PEAK A VELOCITY (MVPAV): 243
MV PEAK E VELOCITY (MVPEV): 0.85
NEUTROPHILS # BLD: 8.7 K/MCL (ref 1.8–7.7)
NEUTROPHILS NFR BLD: 89 %
NRBC BLD MANUAL-RTO: 0 /100 WBC
PHOSPHATE SERPL-MCNC: 3.7 MG/DL (ref 2.4–4.7)
PLATELET # BLD AUTO: 141 K/MCL (ref 140–450)
POTASSIUM SERPL-SCNC: 3.8 MMOL/L (ref 3.4–5.1)
RBC # BLD: 4.33 MIL/MCL (ref 4–5.2)
RSV AG NPH QL IA.RAPID: NOT DETECTED
RV END SYSTOLIC LONGITUDINAL STRAIN FREE WALL (RVGS): 2
SARS-COV-2 RNA RESP QL NAA+PROBE: NOT DETECTED
SERVICE CMNT-IMP: NORMAL
SERVICE CMNT-IMP: NORMAL
SODIUM SERPL-SCNC: 136 MMOL/L (ref 135–145)
TRICUSPID VALVE ANNULAR PEAK VELOCITY (TVAPV): 29
TROPONIN I SERPL DL<=0.01 NG/ML-MCNC: 14 NG/L
TV ESTIMATED RIGHT ARTERIAL PRESSURE (RAP): 11
WBC # BLD: 9.7 K/MCL (ref 4.2–11)

## 2024-06-06 PROCEDURE — 80048 BASIC METABOLIC PNL TOTAL CA: CPT | Performed by: INTERNAL MEDICINE

## 2024-06-06 PROCEDURE — 99497 ADVNCD CARE PLAN 30 MIN: CPT | Performed by: HOSPITALIST

## 2024-06-06 PROCEDURE — 10002800 HB RX 250 W HCPCS: Performed by: INTERNAL MEDICINE

## 2024-06-06 PROCEDURE — 84100 ASSAY OF PHOSPHORUS: CPT | Performed by: INTERNAL MEDICINE

## 2024-06-06 PROCEDURE — 10002801 HB RX 250 W/O HCPCS: Performed by: EMERGENCY MEDICINE

## 2024-06-06 PROCEDURE — 10002807 HB RX 258: Performed by: SPECIALIST

## 2024-06-06 PROCEDURE — 93306 TTE W/DOPPLER COMPLETE: CPT

## 2024-06-06 PROCEDURE — 10004180 HB COUNTER-TRANSPORT

## 2024-06-06 PROCEDURE — 10004651 HB RX, NO CHARGE ITEM: Performed by: INTERNAL MEDICINE

## 2024-06-06 PROCEDURE — 85025 COMPLETE CBC W/AUTO DIFF WBC: CPT | Performed by: INTERNAL MEDICINE

## 2024-06-06 PROCEDURE — 84484 ASSAY OF TROPONIN QUANT: CPT | Performed by: INTERNAL MEDICINE

## 2024-06-06 PROCEDURE — 10002807 HB RX 258: Performed by: EMERGENCY MEDICINE

## 2024-06-06 PROCEDURE — 10002803 HB RX 637: Performed by: INTERNAL MEDICINE

## 2024-06-06 PROCEDURE — 83735 ASSAY OF MAGNESIUM: CPT | Performed by: INTERNAL MEDICINE

## 2024-06-06 PROCEDURE — 93010 ELECTROCARDIOGRAM REPORT: CPT | Performed by: INTERNAL MEDICINE

## 2024-06-06 PROCEDURE — 10002803 HB RX 637: Performed by: HOSPITALIST

## 2024-06-06 PROCEDURE — 36415 COLL VENOUS BLD VENIPUNCTURE: CPT | Performed by: INTERNAL MEDICINE

## 2024-06-06 PROCEDURE — 10002801 HB RX 250 W/O HCPCS: Performed by: INTERNAL MEDICINE

## 2024-06-06 PROCEDURE — 99223 1ST HOSP IP/OBS HIGH 75: CPT | Performed by: INTERNAL MEDICINE

## 2024-06-06 PROCEDURE — 93306 TTE W/DOPPLER COMPLETE: CPT | Performed by: INTERNAL MEDICINE

## 2024-06-06 PROCEDURE — 10002800 HB RX 250 W HCPCS: Performed by: EMERGENCY MEDICINE

## 2024-06-06 PROCEDURE — 10006031 HB ROOM CHARGE TELEMETRY

## 2024-06-06 PROCEDURE — 94640 AIRWAY INHALATION TREATMENT: CPT

## 2024-06-06 PROCEDURE — 99222 1ST HOSP IP/OBS MODERATE 55: CPT | Performed by: INTERNAL MEDICINE

## 2024-06-06 PROCEDURE — 99223 1ST HOSP IP/OBS HIGH 75: CPT | Performed by: HOSPITALIST

## 2024-06-06 RX ORDER — SODIUM CHLORIDE 9 MG/ML
INJECTION, SOLUTION INTRAVENOUS CONTINUOUS
Status: DISCONTINUED | OUTPATIENT
Start: 2024-06-06 | End: 2024-06-07 | Stop reason: HOSPADM

## 2024-06-06 RX ORDER — FUROSEMIDE 10 MG/ML
20 INJECTION INTRAMUSCULAR; INTRAVENOUS ONCE
Status: COMPLETED | OUTPATIENT
Start: 2024-06-06 | End: 2024-06-06

## 2024-06-06 RX ORDER — CARVEDILOL 6.25 MG/1
6.25 TABLET ORAL
Status: DISCONTINUED | OUTPATIENT
Start: 2024-06-06 | End: 2024-06-07 | Stop reason: HOSPADM

## 2024-06-06 RX ORDER — SULFAMETHOXAZOLE AND TRIMETHOPRIM 800; 160 MG/1; MG/1
1 TABLET ORAL 2 TIMES DAILY
Status: ON HOLD | COMMUNITY
End: 2024-06-13 | Stop reason: HOSPADM

## 2024-06-06 RX ORDER — COLCHICINE 0.6 MG/1
0.6 TABLET ORAL DAILY
Status: DISCONTINUED | OUTPATIENT
Start: 2024-06-06 | End: 2024-06-07 | Stop reason: HOSPADM

## 2024-06-06 RX ORDER — CEFAZOLIN SODIUM/WATER 2 G/20 ML
2000 SYRINGE (ML) INTRAVENOUS ONCE
Status: COMPLETED | OUTPATIENT
Start: 2024-06-06 | End: 2024-06-06

## 2024-06-06 RX ORDER — TACROLIMUS 1 MG/1
1 CAPSULE ORAL 2 TIMES DAILY
Status: CANCELLED | OUTPATIENT
Start: 2024-06-06

## 2024-06-06 RX ORDER — GABAPENTIN 400 MG/1
1200 CAPSULE ORAL EVERY 12 HOURS SCHEDULED
Status: DISCONTINUED | OUTPATIENT
Start: 2024-06-06 | End: 2024-06-07 | Stop reason: HOSPADM

## 2024-06-06 RX ORDER — ONDANSETRON HYDROCHLORIDE 8 MG/1
4 TABLET, FILM COATED ORAL EVERY 6 HOURS PRN
Status: DISCONTINUED | OUTPATIENT
Start: 2024-06-06 | End: 2024-06-07 | Stop reason: HOSPADM

## 2024-06-06 RX ORDER — PANTOPRAZOLE SODIUM 40 MG/10ML
40 INJECTION, POWDER, LYOPHILIZED, FOR SOLUTION INTRAVENOUS NIGHTLY
Status: DISCONTINUED | OUTPATIENT
Start: 2024-06-06 | End: 2024-06-07 | Stop reason: HOSPADM

## 2024-06-06 RX ORDER — SULFAMETHOXAZOLE AND TRIMETHOPRIM 800; 160 MG/1; MG/1
1 TABLET ORAL 2 TIMES DAILY
Status: DISCONTINUED | OUTPATIENT
Start: 2024-06-06 | End: 2024-06-07 | Stop reason: HOSPADM

## 2024-06-06 RX ORDER — PREDNISONE 5 MG/1
10 TABLET ORAL DAILY
COMMUNITY

## 2024-06-06 RX ORDER — DEXAMETHASONE SODIUM PHOSPHATE 10 MG/ML
10 INJECTION, SOLUTION INTRAMUSCULAR; INTRAVENOUS ONCE
Status: COMPLETED | OUTPATIENT
Start: 2024-06-06 | End: 2024-06-06

## 2024-06-06 RX ORDER — ONDANSETRON 2 MG/ML
4 INJECTION INTRAMUSCULAR; INTRAVENOUS EVERY 6 HOURS PRN
Status: DISCONTINUED | OUTPATIENT
Start: 2024-06-06 | End: 2024-06-07 | Stop reason: HOSPADM

## 2024-06-06 RX ORDER — PANTOPRAZOLE SODIUM 40 MG/1
40 TABLET, DELAYED RELEASE ORAL NIGHTLY
Status: DISCONTINUED | OUTPATIENT
Start: 2024-06-06 | End: 2024-06-07 | Stop reason: HOSPADM

## 2024-06-06 RX ORDER — HEPARIN SODIUM 5000 [USP'U]/ML
5000 INJECTION, SOLUTION INTRAVENOUS; SUBCUTANEOUS EVERY 8 HOURS SCHEDULED
Status: DISCONTINUED | OUTPATIENT
Start: 2024-06-06 | End: 2024-06-06

## 2024-06-06 RX ORDER — ACETAMINOPHEN 325 MG/1
325 TABLET ORAL EVERY 4 HOURS PRN
Status: DISCONTINUED | OUTPATIENT
Start: 2024-06-06 | End: 2024-06-07 | Stop reason: HOSPADM

## 2024-06-06 RX ORDER — LANOLIN ALCOHOL/MO/W.PET/CERES
400 CREAM (GRAM) TOPICAL ONCE
Status: COMPLETED | OUTPATIENT
Start: 2024-06-06 | End: 2024-06-06

## 2024-06-06 RX ORDER — TACROLIMUS 1 MG/1
1 CAPSULE ORAL 2 TIMES DAILY
Status: DISCONTINUED | OUTPATIENT
Start: 2024-06-06 | End: 2024-06-07 | Stop reason: HOSPADM

## 2024-06-06 RX ORDER — NIFEDIPINE 30 MG/1
30 TABLET, EXTENDED RELEASE ORAL DAILY
Status: DISCONTINUED | OUTPATIENT
Start: 2024-06-06 | End: 2024-06-07 | Stop reason: HOSPADM

## 2024-06-06 RX ORDER — PREDNISONE 10 MG/1
10 TABLET ORAL DAILY
Status: DISCONTINUED | OUTPATIENT
Start: 2024-06-07 | End: 2024-06-07 | Stop reason: HOSPADM

## 2024-06-06 RX ORDER — MONTELUKAST SODIUM 10 MG/1
10 TABLET ORAL AT BEDTIME
Status: DISCONTINUED | OUTPATIENT
Start: 2024-06-06 | End: 2024-06-07 | Stop reason: HOSPADM

## 2024-06-06 RX ORDER — CEFAZOLIN SODIUM/WATER 1 G/10 ML
1000 SYRINGE (ML) INTRAVENOUS DAILY
Status: DISCONTINUED | OUTPATIENT
Start: 2024-06-06 | End: 2024-06-06

## 2024-06-06 RX ORDER — IPRATROPIUM BROMIDE AND ALBUTEROL SULFATE 2.5; .5 MG/3ML; MG/3ML
3 SOLUTION RESPIRATORY (INHALATION)
Status: DISCONTINUED | OUTPATIENT
Start: 2024-06-06 | End: 2024-06-07 | Stop reason: HOSPADM

## 2024-06-06 RX ORDER — LEVOTHYROXINE SODIUM 0.03 MG/1
25 TABLET ORAL DAILY
Status: DISCONTINUED | OUTPATIENT
Start: 2024-06-06 | End: 2024-06-07 | Stop reason: HOSPADM

## 2024-06-06 RX ADMIN — Medication 400 MG: at 21:55

## 2024-06-06 RX ADMIN — IPRATROPIUM BROMIDE AND ALBUTEROL SULFATE 3 ML: 2.5; .5 SOLUTION RESPIRATORY (INHALATION) at 08:41

## 2024-06-06 RX ADMIN — PANTOPRAZOLE SODIUM 40 MG: 40 TABLET, DELAYED RELEASE ORAL at 21:57

## 2024-06-06 RX ADMIN — COLCHICINE 0.6 MG: 0.6 TABLET ORAL at 11:46

## 2024-06-06 RX ADMIN — SODIUM CHLORIDE: 9 INJECTION, SOLUTION INTRAVENOUS at 12:46

## 2024-06-06 RX ADMIN — APIXABAN 5 MG: 5 TABLET, FILM COATED ORAL at 21:55

## 2024-06-06 RX ADMIN — TACROLIMUS 1 MG: 1 CAPSULE ORAL at 09:31

## 2024-06-06 RX ADMIN — SULFAMETHOXAZOLE AND TRIMETHOPRIM 1 TABLET: 800; 160 TABLET ORAL at 21:55

## 2024-06-06 RX ADMIN — LEVOTHYROXINE SODIUM 25 MCG: 0.03 TABLET ORAL at 09:31

## 2024-06-06 RX ADMIN — GABAPENTIN 1200 MG: 400 CAPSULE ORAL at 21:55

## 2024-06-06 RX ADMIN — TACROLIMUS 1 MG: 1 CAPSULE ORAL at 21:55

## 2024-06-06 RX ADMIN — Medication 400 MG: at 11:46

## 2024-06-06 RX ADMIN — CEFTRIAXONE SODIUM 1000 MG: 10 INJECTION, POWDER, FOR SOLUTION INTRAVENOUS at 09:32

## 2024-06-06 RX ADMIN — GABAPENTIN 1200 MG: 400 CAPSULE ORAL at 09:31

## 2024-06-06 RX ADMIN — FUROSEMIDE 20 MG: 10 INJECTION, SOLUTION INTRAMUSCULAR; INTRAVENOUS at 01:06

## 2024-06-06 RX ADMIN — ACETAMINOPHEN 325 MG: 325 TABLET ORAL at 17:51

## 2024-06-06 RX ADMIN — CARVEDILOL 6.25 MG: 6.25 TABLET, FILM COATED ORAL at 17:51

## 2024-06-06 RX ADMIN — APIXABAN 5 MG: 5 TABLET, FILM COATED ORAL at 09:31

## 2024-06-06 RX ADMIN — IPRATROPIUM BROMIDE AND ALBUTEROL SULFATE 3 ML: 2.5; .5 SOLUTION RESPIRATORY (INHALATION) at 00:16

## 2024-06-06 RX ADMIN — DEXAMETHASONE SODIUM PHOSPHATE 10 MG: 10 INJECTION, SOLUTION INTRAMUSCULAR; INTRAVENOUS at 00:57

## 2024-06-06 RX ADMIN — AZITHROMYCIN 500 MG: 500 INJECTION, POWDER, LYOPHILIZED, FOR SOLUTION INTRAVENOUS at 00:53

## 2024-06-06 RX ADMIN — CEFTRIAXONE SODIUM 2000 MG: 10 INJECTION, POWDER, FOR SOLUTION INTRAVENOUS at 01:47

## 2024-06-06 RX ADMIN — MONTELUKAST SODIUM 10 MG: 10 TABLET, FILM COATED ORAL at 21:55

## 2024-06-06 RX ADMIN — NIFEDIPINE 30 MG: 30 TABLET, EXTENDED RELEASE ORAL at 09:30

## 2024-06-06 RX ADMIN — METHYLPREDNISOLONE SODIUM SUCCINATE 40 MG: 40 INJECTION, POWDER, FOR SOLUTION INTRAMUSCULAR; INTRAVENOUS at 09:35

## 2024-06-06 SDOH — SOCIAL STABILITY: SOCIAL INSECURITY: HOW OFTEN DOES ANYONE, INCLUDING FAMILY AND FRIENDS, INSULT OR TALK DOWN TO YOU?: NEVER

## 2024-06-06 SDOH — SOCIAL STABILITY: SOCIAL INSECURITY: HOW OFTEN DOES ANYONE, INCLUDING FAMILY AND FRIENDS, SCREAM OR CURSE AT YOU?: NEVER

## 2024-06-06 SDOH — SOCIAL STABILITY: SOCIAL NETWORK: SUPPORT SYSTEMS: CHILDREN

## 2024-06-06 SDOH — ECONOMIC STABILITY: HOUSING INSECURITY: DO YOU HAVE PROBLEMS WITH ANY OF THE FOLLOWING?: NONE OF THE ABOVE

## 2024-06-06 SDOH — ECONOMIC STABILITY: FOOD INSECURITY: WITHIN THE PAST 12 MONTHS, THE FOOD YOU BOUGHT JUST DIDN'T LAST AND YOU DIDN'T HAVE MONEY TO GET MORE.: NEVER TRUE

## 2024-06-06 SDOH — ECONOMIC STABILITY: INCOME INSECURITY: IN THE PAST 12 MONTHS, HAS THE ELECTRIC, GAS, OIL, OR WATER COMPANY THREATENED TO SHUT OFF SERVICE IN YOUR HOME?: NO

## 2024-06-06 SDOH — ECONOMIC STABILITY: HOUSING INSECURITY: WHAT IS YOUR LIVING SITUATION TODAY?: ADULT CHILDREN

## 2024-06-06 SDOH — SOCIAL STABILITY: SOCIAL INSECURITY: HOW OFTEN DOES ANYONE, INCLUDING FAMILY AND FRIENDS, THREATEN YOU WITH HARM?: NEVER

## 2024-06-06 SDOH — HEALTH STABILITY: PHYSICAL HEALTH: DO YOU HAVE DIFFICULTY DRESSING OR BATHING?: NO

## 2024-06-06 SDOH — HEALTH STABILITY: GENERAL: BECAUSE OF A PHYSICAL, MENTAL, OR EMOTIONAL CONDITION, DO YOU HAVE DIFFICULTY DOING ERRANDS ALONE?: NO

## 2024-06-06 SDOH — ECONOMIC STABILITY: HOUSING INSECURITY: WHAT IS YOUR LIVING SITUATION TODAY?: I HAVE A STEADY PLACE TO LIVE

## 2024-06-06 SDOH — ECONOMIC STABILITY: GENERAL: WOULD YOU LIKE HELP WITH ANY OF THE FOLLOWING NEEDS?: I DON'T WANT HELP WITH ANY OF THESE

## 2024-06-06 SDOH — ECONOMIC STABILITY: TRANSPORTATION INSECURITY
IN THE PAST 12 MONTHS, HAS LACK OF RELIABLE TRANSPORTATION KEPT YOU FROM MEDICAL APPOINTMENTS, MEETINGS, WORK OR FROM GETTING THINGS NEEDED FOR DAILY LIVING?: NO

## 2024-06-06 SDOH — ECONOMIC STABILITY: GENERAL

## 2024-06-06 SDOH — SOCIAL STABILITY: SOCIAL INSECURITY: HOW OFTEN DOES ANYONE, INCLUDING FAMILY AND FRIENDS, PHYSICALLY HURT YOU?: NEVER

## 2024-06-06 SDOH — HEALTH STABILITY: PHYSICAL HEALTH: DO YOU HAVE SERIOUS DIFFICULTY WALKING OR CLIMBING STAIRS?: NO

## 2024-06-06 SDOH — ECONOMIC STABILITY: HOUSING INSECURITY: WHAT IS YOUR LIVING SITUATION TODAY?: HOUSE

## 2024-06-06 ASSESSMENT — ORIENTATION MEMORY CONCENTRATION TEST (OMCT)
OMCT SCORE: 0
WHAT MONTH IS IT NOW: CORRECT
COUNT BACKWARDS FROM 20 TO 1: CORRECT
WHAT YEAR IS IT NOW (MUST BE EXACT): CORRECT
OMCT INTERPRETATION: 0-6: NO SIGNIFICANT IMPAIRMENT
WHAT TIME IS IT (NO WATCH OR CLOCK): CORRECT
REPEAT THE NAME AND ADDRESS I ASKED YOU TO REMEMBER: CORRECT
SAY THE MONTHS IN REVERSE ORDER STARTING WITH LAST MONTH: CORRECT

## 2024-06-06 ASSESSMENT — LIFESTYLE VARIABLES
ALCOHOL_USE_STATUS: NO OR LOW RISK WITH VALIDATED TOOL
HOW OFTEN DO YOU HAVE A DRINK CONTAINING ALCOHOL: NEVER
AUDIT-C TOTAL SCORE: 0
HOW OFTEN DO YOU HAVE 6 OR MORE DRINKS ON ONE OCCASION: NEVER
HOW MANY STANDARD DRINKS CONTAINING ALCOHOL DO YOU HAVE ON A TYPICAL DAY: 0,1 OR 2

## 2024-06-06 ASSESSMENT — PAIN SCALES - GENERAL
PAINLEVEL_OUTOF10: 4
PAINLEVEL_OUTOF10: 0

## 2024-06-06 ASSESSMENT — COGNITIVE AND FUNCTIONAL STATUS - GENERAL
DO YOU HAVE DIFFICULTY DRESSING OR BATHING: NO
DO YOU HAVE SERIOUS DIFFICULTY WALKING OR CLIMBING STAIRS: NO

## 2024-06-06 ASSESSMENT — PATIENT HEALTH QUESTIONNAIRE - PHQ9
SUM OF ALL RESPONSES TO PHQ9 QUESTIONS 1 AND 2: 0
CLINICAL INTERPRETATION OF PHQ2 SCORE: NO FURTHER SCREENING NEEDED
IS PATIENT ABLE TO COMPLETE PHQ2 OR PHQ9: YES
SUM OF ALL RESPONSES TO PHQ9 QUESTIONS 1 AND 2: 0
2. FEELING DOWN, DEPRESSED OR HOPELESS: NOT AT ALL
1. LITTLE INTEREST OR PLEASURE IN DOING THINGS: NOT AT ALL

## 2024-06-06 ASSESSMENT — ACTIVITIES OF DAILY LIVING (ADL)
ADL_SCORE: 12
ADL_BEFORE_ADMISSION: INDEPENDENT
ADL_SHORT_OF_BREATH: NO
RECENT_DECLINE_ADL: YES, ACUTE ILLNESS WITHOUT THERAPY NEEDS

## 2024-06-06 ASSESSMENT — COLUMBIA-SUICIDE SEVERITY RATING SCALE - C-SSRS
2. HAVE YOU ACTUALLY HAD ANY THOUGHTS OF KILLING YOURSELF?: NO
IS THE PATIENT ABLE TO COMPLETE C-SSRS: YES
6. HAVE YOU EVER DONE ANYTHING, STARTED TO DO ANYTHING, OR PREPARED TO DO ANYTHING TO END YOUR LIFE?: NO
1. WITHIN THE PAST MONTH, HAVE YOU WISHED YOU WERE DEAD OR WISHED YOU COULD GO TO SLEEP AND NOT WAKE UP?: NO

## 2024-06-07 VITALS
RESPIRATION RATE: 18 BRPM | HEIGHT: 62 IN | OXYGEN SATURATION: 94 % | HEART RATE: 61 BPM | SYSTOLIC BLOOD PRESSURE: 135 MMHG | TEMPERATURE: 97.7 F | DIASTOLIC BLOOD PRESSURE: 72 MMHG | BODY MASS INDEX: 27.95 KG/M2 | WEIGHT: 151.9 LBS

## 2024-06-07 LAB
ALBUMIN SERPL-MCNC: 2.6 G/DL (ref 3.6–5.1)
ANION GAP SERPL CALC-SCNC: 12 MMOL/L (ref 7–19)
ATRIAL RATE (BPM): 60
BASOPHILS # BLD: 0 K/MCL (ref 0–0.3)
BASOPHILS NFR BLD: 0 %
BUN SERPL-MCNC: 21 MG/DL (ref 6–20)
BUN/CREAT SERPL: 27 (ref 7–25)
CALCIUM SERPL-MCNC: 8.1 MG/DL (ref 8.4–10.2)
CHLORIDE SERPL-SCNC: 106 MMOL/L (ref 97–110)
CO2 SERPL-SCNC: 27 MMOL/L (ref 21–32)
CREAT SERPL-MCNC: 0.79 MG/DL (ref 0.51–0.95)
DEPRECATED RDW RBC: 48.2 FL (ref 39–50)
EGFRCR SERPLBLD CKD-EPI 2021: 79 ML/MIN/{1.73_M2}
EOSINOPHIL # BLD: 0 K/MCL (ref 0–0.5)
EOSINOPHIL NFR BLD: 0 %
ERYTHROCYTE [DISTWIDTH] IN BLOOD: 13.8 % (ref 11–15)
FASTING DURATION TIME PATIENT: ABNORMAL H
GLUCOSE SERPL-MCNC: 105 MG/DL (ref 70–99)
HCT VFR BLD CALC: 39.5 % (ref 36–46.5)
HGB BLD-MCNC: 13 G/DL (ref 12–15.5)
IMM GRANULOCYTES # BLD AUTO: 0.1 K/MCL (ref 0–0.2)
IMM GRANULOCYTES # BLD: 1 %
LYMPHOCYTES # BLD: 0.8 K/MCL (ref 1–4)
LYMPHOCYTES NFR BLD: 8 %
MAGNESIUM SERPL-MCNC: 1.8 MG/DL (ref 1.7–2.4)
MCH RBC QN AUTO: 31.3 PG (ref 26–34)
MCHC RBC AUTO-ENTMCNC: 32.9 G/DL (ref 32–36.5)
MCV RBC AUTO: 95 FL (ref 78–100)
MONOCYTES # BLD: 0.8 K/MCL (ref 0.3–0.9)
MONOCYTES NFR BLD: 8 %
NEUTROPHILS # BLD: 8.5 K/MCL (ref 1.8–7.7)
NEUTROPHILS NFR BLD: 83 %
NRBC BLD MANUAL-RTO: 0 /100 WBC
P AXIS (DEGREES): 71
PHOSPHATE SERPL-MCNC: 3.6 MG/DL (ref 2.4–4.7)
PLATELET # BLD AUTO: 137 K/MCL (ref 140–450)
POTASSIUM SERPL-SCNC: 3.8 MMOL/L (ref 3.4–5.1)
PR-INTERVAL (MSEC): 178
QRS-INTERVAL (MSEC): 84
QT-INTERVAL (MSEC): 412
QTC: 412
R AXIS (DEGREES): -11
RBC # BLD: 4.16 MIL/MCL (ref 4–5.2)
REPORT TEXT: NORMAL
SODIUM SERPL-SCNC: 141 MMOL/L (ref 135–145)
T AXIS (DEGREES): 57
TACROLIMUS BLD-MCNC: 4 NG/ML (ref 5–20)
VENTRICULAR RATE EKG/MIN (BPM): 60
WBC # BLD: 10.2 K/MCL (ref 4.2–11)

## 2024-06-07 PROCEDURE — 85025 COMPLETE CBC W/AUTO DIFF WBC: CPT | Performed by: INTERNAL MEDICINE

## 2024-06-07 PROCEDURE — 10002807 HB RX 258: Performed by: SPECIALIST

## 2024-06-07 PROCEDURE — 99406 BEHAV CHNG SMOKING 3-10 MIN: CPT | Performed by: STUDENT IN AN ORGANIZED HEALTH CARE EDUCATION/TRAINING PROGRAM

## 2024-06-07 PROCEDURE — 83735 ASSAY OF MAGNESIUM: CPT | Performed by: INTERNAL MEDICINE

## 2024-06-07 PROCEDURE — 10002803 HB RX 637: Performed by: INTERNAL MEDICINE

## 2024-06-07 PROCEDURE — 99239 HOSP IP/OBS DSCHRG MGMT >30: CPT | Performed by: INTERNAL MEDICINE

## 2024-06-07 PROCEDURE — 80197 ASSAY OF TACROLIMUS: CPT | Performed by: SPECIALIST

## 2024-06-07 PROCEDURE — 10002803 HB RX 637: Performed by: HOSPITALIST

## 2024-06-07 PROCEDURE — 94761 N-INVAS EAR/PLS OXIMETRY MLT: CPT

## 2024-06-07 PROCEDURE — 80069 RENAL FUNCTION PANEL: CPT | Performed by: SPECIALIST

## 2024-06-07 PROCEDURE — 99232 SBSQ HOSP IP/OBS MODERATE 35: CPT | Performed by: STUDENT IN AN ORGANIZED HEALTH CARE EDUCATION/TRAINING PROGRAM

## 2024-06-07 RX ADMIN — COLCHICINE 0.6 MG: 0.6 TABLET ORAL at 08:55

## 2024-06-07 RX ADMIN — GABAPENTIN 1200 MG: 400 CAPSULE ORAL at 08:55

## 2024-06-07 RX ADMIN — LEVOTHYROXINE SODIUM 25 MCG: 0.03 TABLET ORAL at 08:55

## 2024-06-07 RX ADMIN — SULFAMETHOXAZOLE AND TRIMETHOPRIM 1 TABLET: 800; 160 TABLET ORAL at 08:56

## 2024-06-07 RX ADMIN — NIFEDIPINE 30 MG: 30 TABLET, EXTENDED RELEASE ORAL at 08:55

## 2024-06-07 RX ADMIN — TACROLIMUS 1 MG: 1 CAPSULE ORAL at 08:56

## 2024-06-07 RX ADMIN — SODIUM CHLORIDE: 9 INJECTION, SOLUTION INTRAVENOUS at 05:01

## 2024-06-07 RX ADMIN — PREDNISONE 10 MG: 10 TABLET ORAL at 08:55

## 2024-06-07 RX ADMIN — APIXABAN 5 MG: 5 TABLET, FILM COATED ORAL at 08:55

## 2024-06-07 ASSESSMENT — PAIN SCALES - GENERAL
PAINLEVEL_OUTOF10: 0
PAINLEVEL_OUTOF10: 0

## 2024-06-09 ENCOUNTER — TELEPHONE (OUTPATIENT)
Dept: CARE COORDINATION | Age: 74
End: 2024-06-09

## 2024-06-09 ENCOUNTER — CASE MANAGEMENT (OUTPATIENT)
Dept: CARE COORDINATION | Age: 74
End: 2024-06-09

## 2024-06-10 ENCOUNTER — CASE MANAGEMENT (OUTPATIENT)
Dept: CASE MANAGEMENT | Age: 74
End: 2024-06-10

## 2024-06-10 ENCOUNTER — HOSPITAL ENCOUNTER (INPATIENT)
Age: 74
LOS: 2 days | Discharge: HOME | DRG: 189 | End: 2024-06-13
Attending: EMERGENCY MEDICINE | Admitting: INTERNAL MEDICINE

## 2024-06-10 ENCOUNTER — CASE MANAGEMENT (OUTPATIENT)
Dept: CARE COORDINATION | Age: 74
End: 2024-06-10

## 2024-06-10 DIAGNOSIS — J81.0 ACUTE PULMONARY EDEMA  (CMD): Primary | ICD-10-CM

## 2024-06-10 DIAGNOSIS — I50.32 CHRONIC DIASTOLIC HEART FAILURE  (CMD): ICD-10-CM

## 2024-06-10 DIAGNOSIS — J96.21 ACUTE ON CHRONIC RESPIRATORY FAILURE WITH HYPOXIA  (CMD): ICD-10-CM

## 2024-06-10 DIAGNOSIS — R19.7 DIARRHEA, UNSPECIFIED TYPE: ICD-10-CM

## 2024-06-10 PROCEDURE — 85025 COMPLETE CBC W/AUTO DIFF WBC: CPT | Performed by: EMERGENCY MEDICINE

## 2024-06-10 PROCEDURE — 80053 COMPREHEN METABOLIC PANEL: CPT | Performed by: EMERGENCY MEDICINE

## 2024-06-10 PROCEDURE — 93010 ELECTROCARDIOGRAM REPORT: CPT | Performed by: INTERNAL MEDICINE

## 2024-06-10 PROCEDURE — 96374 THER/PROPH/DIAG INJ IV PUSH: CPT

## 2024-06-10 PROCEDURE — 84484 ASSAY OF TROPONIN QUANT: CPT | Performed by: EMERGENCY MEDICINE

## 2024-06-10 PROCEDURE — 36430 TRANSFUSION BLD/BLD COMPNT: CPT

## 2024-06-10 PROCEDURE — 84145 PROCALCITONIN (PCT): CPT | Performed by: EMERGENCY MEDICINE

## 2024-06-10 PROCEDURE — 83880 ASSAY OF NATRIURETIC PEPTIDE: CPT | Performed by: EMERGENCY MEDICINE

## 2024-06-10 PROCEDURE — 93005 ELECTROCARDIOGRAM TRACING: CPT | Performed by: EMERGENCY MEDICINE

## 2024-06-10 PROCEDURE — 99285 EMERGENCY DEPT VISIT HI MDM: CPT

## 2024-06-10 RX ORDER — 0.9 % SODIUM CHLORIDE 0.9 %
2 VIAL (ML) INJECTION EVERY 12 HOURS SCHEDULED
Status: DISCONTINUED | OUTPATIENT
Start: 2024-06-11 | End: 2024-06-13 | Stop reason: HOSPADM

## 2024-06-10 ASSESSMENT — PAIN SCALES - GENERAL: PAINLEVEL_OUTOF10: 0

## 2024-06-11 ENCOUNTER — APPOINTMENT (OUTPATIENT)
Dept: GENERAL RADIOLOGY | Age: 74
DRG: 189 | End: 2024-06-11
Attending: EMERGENCY MEDICINE

## 2024-06-11 PROBLEM — J44.9 COPD WITHOUT EXACERBATION  (CMD): Status: ACTIVE | Noted: 2024-06-06

## 2024-06-11 PROBLEM — J96.11 CHRONIC RESPIRATORY FAILURE WITH HYPOXIA  (CMD): Status: ACTIVE | Noted: 2024-06-11

## 2024-06-11 PROBLEM — J96.90 RESPIRATORY FAILURE  (CMD): Status: RESOLVED | Noted: 2024-06-11 | Resolved: 2024-06-11

## 2024-06-11 PROBLEM — J96.21 ACUTE ON CHRONIC RESPIRATORY FAILURE WITH HYPOXIA  (CMD): Status: RESOLVED | Noted: 2024-06-11 | Resolved: 2024-06-11

## 2024-06-11 PROBLEM — I73.9 PAD (PERIPHERAL ARTERY DISEASE) (CMD): Status: ACTIVE | Noted: 2024-06-11

## 2024-06-11 PROBLEM — J96.21 ACUTE ON CHRONIC RESPIRATORY FAILURE WITH HYPOXIA  (CMD): Status: ACTIVE | Noted: 2024-06-11

## 2024-06-11 PROBLEM — Z94.0 HISTORY OF RENAL TRANSPLANT (CMD): Status: ACTIVE | Noted: 2024-06-11

## 2024-06-11 PROBLEM — E03.9 PRIMARY HYPOTHYROIDISM: Status: ACTIVE | Noted: 2024-06-11

## 2024-06-11 PROBLEM — R19.7 DIARRHEA: Status: ACTIVE | Noted: 2024-06-11

## 2024-06-11 PROBLEM — J96.90 RESPIRATORY FAILURE  (CMD): Status: ACTIVE | Noted: 2024-06-11

## 2024-06-11 PROBLEM — E87.6 HYPOKALEMIA: Status: ACTIVE | Noted: 2024-06-11

## 2024-06-11 PROBLEM — C88.4: Status: ACTIVE | Noted: 2024-06-11

## 2024-06-11 PROBLEM — J96.01 ACUTE RESPIRATORY FAILURE WITH HYPOXIA  (CMD): Status: ACTIVE | Noted: 2024-06-11

## 2024-06-11 PROBLEM — J96.91 RESPIRATORY FAILURE WITH HYPOXIA  (CMD): Status: RESOLVED | Noted: 2024-06-11 | Resolved: 2024-06-11

## 2024-06-11 PROBLEM — J96.91 RESPIRATORY FAILURE WITH HYPOXIA  (CMD): Status: ACTIVE | Noted: 2024-06-11

## 2024-06-11 PROBLEM — D89.1 CRYOGLOBULINEMIA (CMD): Status: ACTIVE | Noted: 2024-06-11

## 2024-06-11 LAB
ALBUMIN SERPL-MCNC: 3.1 G/DL (ref 3.6–5.1)
ALBUMIN/GLOB SERPL: 1 {RATIO} (ref 1–2.4)
ALP SERPL-CCNC: 79 UNITS/L (ref 45–117)
ALT SERPL-CCNC: 21 UNITS/L
ANION GAP SERPL CALC-SCNC: 11 MMOL/L (ref 7–19)
ANION GAP SERPL CALC-SCNC: 12 MMOL/L (ref 7–19)
AST SERPL-CCNC: 16 UNITS/L
ATRIAL RATE (BPM): 63
BASE EXCESS / DEFICIT, VENOUS - RESPIRATORY: 4 MMOL/L (ref -2–2)
BASOPHILS # BLD: 0 K/MCL (ref 0–0.3)
BASOPHILS # BLD: 0.1 K/MCL (ref 0–0.3)
BASOPHILS NFR BLD: 1 %
BASOPHILS NFR BLD: 1 %
BDY SITE: ABNORMAL
BILIRUB SERPL-MCNC: 0.7 MG/DL (ref 0.2–1)
BODY TEMPERATURE: 37 DEGREES C
BUN SERPL-MCNC: 10 MG/DL (ref 6–20)
BUN SERPL-MCNC: 11 MG/DL (ref 6–20)
BUN/CREAT SERPL: 13 (ref 7–25)
BUN/CREAT SERPL: 15 (ref 7–25)
CA-I BLD-SCNC: 1.04 MMOL/L (ref 1.15–1.29)
CALCIUM SERPL-MCNC: 8.2 MG/DL (ref 8.4–10.2)
CALCIUM SERPL-MCNC: 8.4 MG/DL (ref 8.4–10.2)
CHLORIDE SERPL-SCNC: 103 MMOL/L (ref 97–110)
CHLORIDE SERPL-SCNC: 105 MMOL/L (ref 97–110)
CO2 SERPL-SCNC: 28 MMOL/L (ref 21–32)
CO2 SERPL-SCNC: 30 MMOL/L (ref 21–32)
COHGB MFR BLDV: 2 %
CREAT SERPL-MCNC: 0.73 MG/DL (ref 0.51–0.95)
CREAT SERPL-MCNC: 0.76 MG/DL (ref 0.51–0.95)
DEPRECATED RDW RBC: 48.6 FL (ref 39–50)
DEPRECATED RDW RBC: 49.9 FL (ref 39–50)
EGFRCR SERPLBLD CKD-EPI 2021: 83 ML/MIN/{1.73_M2}
EGFRCR SERPLBLD CKD-EPI 2021: 87 ML/MIN/{1.73_M2}
EOSINOPHIL # BLD: 0.1 K/MCL (ref 0–0.5)
EOSINOPHIL # BLD: 0.1 K/MCL (ref 0–0.5)
EOSINOPHIL NFR BLD: 1 %
EOSINOPHIL NFR BLD: 2 %
ERYTHROCYTE [DISTWIDTH] IN BLOOD: 13.8 % (ref 11–15)
ERYTHROCYTE [DISTWIDTH] IN BLOOD: 13.8 % (ref 11–15)
FASTING DURATION TIME PATIENT: ABNORMAL H
FASTING DURATION TIME PATIENT: ABNORMAL H
FIO2 ON VENT: 32 %
GLOBULIN SER-MCNC: 3.1 G/DL (ref 2–4)
GLUCOSE BLD-MCNC: 93 MG/DL (ref 65–99)
GLUCOSE SERPL-MCNC: 96 MG/DL (ref 70–99)
GLUCOSE SERPL-MCNC: 98 MG/DL (ref 70–99)
HCO3 BLDV-SCNC: 30 MMOL/L (ref 22–28)
HCT VFR BLD CALC: 43 % (ref 36–46.5)
HCT VFR BLD CALC: 43.7 % (ref 36–46.5)
HCT VFR BLD CALC: 44.4 % (ref 36–46.5)
HGB BLD-MCNC: 14.2 G/DL (ref 12–15.5)
HGB BLD-MCNC: 14.4 G/DL (ref 12–15.5)
HGB BLD-MCNC: 14.4 G/DL (ref 12–15.5)
IMM GRANULOCYTES # BLD AUTO: 0 K/MCL (ref 0–0.2)
IMM GRANULOCYTES # BLD AUTO: 0 K/MCL (ref 0–0.2)
IMM GRANULOCYTES # BLD: 0 %
IMM GRANULOCYTES # BLD: 1 %
LACTATE BLDV-SCNC: 1.1 MMOL/L
LYMPHOCYTES # BLD: 1.3 K/MCL (ref 1–4)
LYMPHOCYTES # BLD: 1.4 K/MCL (ref 1–4)
LYMPHOCYTES NFR BLD: 16 %
LYMPHOCYTES NFR BLD: 16 %
MAGNESIUM SERPL-MCNC: 1.3 MG/DL (ref 1.7–2.4)
MAGNESIUM SERPL-MCNC: 2.3 MG/DL (ref 1.7–2.4)
MCH RBC QN AUTO: 30.9 PG (ref 26–34)
MCH RBC QN AUTO: 31.5 PG (ref 26–34)
MCHC RBC AUTO-ENTMCNC: 32.4 G/DL (ref 32–36.5)
MCHC RBC AUTO-ENTMCNC: 32.5 G/DL (ref 32–36.5)
MCV RBC AUTO: 95.3 FL (ref 78–100)
MCV RBC AUTO: 96.9 FL (ref 78–100)
METHGB MFR BLDMV: 0.6 %
MONOCYTES # BLD: 0.8 K/MCL (ref 0.3–0.9)
MONOCYTES # BLD: 0.9 K/MCL (ref 0.3–0.9)
MONOCYTES NFR BLD: 10 %
MONOCYTES NFR BLD: 10 %
NEUTROPHILS # BLD: 5.7 K/MCL (ref 1.8–7.7)
NEUTROPHILS # BLD: 6.4 K/MCL (ref 1.8–7.7)
NEUTROPHILS NFR BLD: 71 %
NEUTROPHILS NFR BLD: 71 %
NRBC BLD MANUAL-RTO: 0 /100 WBC
NRBC BLD MANUAL-RTO: 0 /100 WBC
NT-PROBNP SERPL-MCNC: 4282 PG/ML
OXYHGB MFR BLDV: 74 % (ref 60–80)
P AXIS (DEGREES): 87
PCO2 BLDV: 49 MM HG (ref 38–51)
PH BLDV: 7.39 UNITS (ref 7.35–7.45)
PLATELET # BLD AUTO: 174 K/MCL (ref 140–450)
PLATELET # BLD AUTO: 201 K/MCL (ref 140–450)
PO2 BLDV: 43 MM HG (ref 35–42)
POTASSIUM BLD-SCNC: 3.6 MMOL/L (ref 3.4–5.1)
POTASSIUM SERPL-SCNC: 3.3 MMOL/L (ref 3.4–5.1)
POTASSIUM SERPL-SCNC: 3.6 MMOL/L (ref 3.4–5.1)
POTASSIUM SERPL-SCNC: 4.6 MMOL/L (ref 3.4–5.1)
PR-INTERVAL (MSEC): 164
PROCALCITONIN SERPL IA-MCNC: <0.05 NG/ML
PROT SERPL-MCNC: 6.2 G/DL (ref 6.4–8.2)
QRS-INTERVAL (MSEC): 94
QT-INTERVAL (MSEC): 426
QTC: 436
R AXIS (DEGREES): -29
RAINBOW EXTRA TUBES HOLD SPECIMEN: NORMAL
RAINBOW EXTRA TUBES HOLD SPECIMEN: NORMAL
RBC # BLD: 4.51 MIL/MCL (ref 4–5.2)
RBC # BLD: 4.66 MIL/MCL (ref 4–5.2)
REPORT TEXT: NORMAL
SAO2 % BLDV: 15 %
SAO2 DF BLDV: 76 % (ref 60–80)
SODIUM BLD-SCNC: 138 MMOL/L (ref 135–145)
SODIUM SERPL-SCNC: 141 MMOL/L (ref 135–145)
SODIUM SERPL-SCNC: 141 MMOL/L (ref 135–145)
T AXIS (DEGREES): 87
TROPONIN I SERPL DL<=0.01 NG/ML-MCNC: 28 NG/L
VENTRICULAR RATE EKG/MIN (BPM): 63
WBC # BLD: 8 K/MCL (ref 4.2–11)
WBC # BLD: 8.9 K/MCL (ref 4.2–11)

## 2024-06-11 PROCEDURE — 97166 OT EVAL MOD COMPLEX 45 MIN: CPT

## 2024-06-11 PROCEDURE — 84295 ASSAY OF SERUM SODIUM: CPT

## 2024-06-11 PROCEDURE — 83735 ASSAY OF MAGNESIUM: CPT | Performed by: INTERNAL MEDICINE

## 2024-06-11 PROCEDURE — 10002803 HB RX 637: Performed by: INTERNAL MEDICINE

## 2024-06-11 PROCEDURE — G0378 HOSPITAL OBSERVATION PER HR: HCPCS

## 2024-06-11 PROCEDURE — 10004651 HB RX, NO CHARGE ITEM: Performed by: EMERGENCY MEDICINE

## 2024-06-11 PROCEDURE — 71045 X-RAY EXAM CHEST 1 VIEW: CPT

## 2024-06-11 PROCEDURE — 84132 ASSAY OF SERUM POTASSIUM: CPT

## 2024-06-11 PROCEDURE — 84132 ASSAY OF SERUM POTASSIUM: CPT | Performed by: INTERNAL MEDICINE

## 2024-06-11 PROCEDURE — 85025 COMPLETE CBC W/AUTO DIFF WBC: CPT | Performed by: INTERNAL MEDICINE

## 2024-06-11 PROCEDURE — 10002800 HB RX 250 W HCPCS: Performed by: INTERNAL MEDICINE

## 2024-06-11 PROCEDURE — 97535 SELF CARE MNGMENT TRAINING: CPT

## 2024-06-11 PROCEDURE — 85018 HEMOGLOBIN: CPT

## 2024-06-11 PROCEDURE — 10006031 HB ROOM CHARGE TELEMETRY

## 2024-06-11 PROCEDURE — 82330 ASSAY OF CALCIUM: CPT

## 2024-06-11 PROCEDURE — 83605 ASSAY OF LACTIC ACID: CPT

## 2024-06-11 PROCEDURE — 83050 HGB METHEMOGLOBIN QUAN: CPT

## 2024-06-11 PROCEDURE — 80048 BASIC METABOLIC PNL TOTAL CA: CPT | Performed by: INTERNAL MEDICINE

## 2024-06-11 PROCEDURE — 82947 ASSAY GLUCOSE BLOOD QUANT: CPT

## 2024-06-11 PROCEDURE — 96375 TX/PRO/DX INJ NEW DRUG ADDON: CPT

## 2024-06-11 PROCEDURE — 36430 TRANSFUSION BLD/BLD COMPNT: CPT

## 2024-06-11 PROCEDURE — 10002800 HB RX 250 W HCPCS: Performed by: EMERGENCY MEDICINE

## 2024-06-11 PROCEDURE — 10002801 HB RX 250 W/O HCPCS: Performed by: INTERNAL MEDICINE

## 2024-06-11 PROCEDURE — 99223 1ST HOSP IP/OBS HIGH 75: CPT | Performed by: INTERNAL MEDICINE

## 2024-06-11 RX ORDER — ONDANSETRON 4 MG/1
4 TABLET, ORALLY DISINTEGRATING ORAL EVERY 6 HOURS PRN
Status: DISCONTINUED | OUTPATIENT
Start: 2024-06-11 | End: 2024-06-13 | Stop reason: HOSPADM

## 2024-06-11 RX ORDER — TACROLIMUS 1 MG/1
1 CAPSULE ORAL 2 TIMES DAILY
Status: DISCONTINUED | OUTPATIENT
Start: 2024-06-11 | End: 2024-06-13 | Stop reason: HOSPADM

## 2024-06-11 RX ORDER — SULFAMETHOXAZOLE AND TRIMETHOPRIM 800; 160 MG/1; MG/1
1 TABLET ORAL 2 TIMES DAILY
Status: DISCONTINUED | OUTPATIENT
Start: 2024-06-11 | End: 2024-06-13 | Stop reason: HOSPADM

## 2024-06-11 RX ORDER — NIFEDIPINE 30 MG/1
30 TABLET, EXTENDED RELEASE ORAL DAILY
Status: DISCONTINUED | OUTPATIENT
Start: 2024-06-11 | End: 2024-06-13 | Stop reason: HOSPADM

## 2024-06-11 RX ORDER — MAGNESIUM SULFATE 4 G/50ML
4 INJECTION INTRAVENOUS ONCE
Status: COMPLETED | OUTPATIENT
Start: 2024-06-11 | End: 2024-06-11

## 2024-06-11 RX ORDER — ONDANSETRON 2 MG/ML
4 INJECTION INTRAMUSCULAR; INTRAVENOUS EVERY 6 HOURS PRN
Status: DISCONTINUED | OUTPATIENT
Start: 2024-06-11 | End: 2024-06-13 | Stop reason: HOSPADM

## 2024-06-11 RX ORDER — ACETAMINOPHEN 650 MG/1
650 SUPPOSITORY RECTAL EVERY 4 HOURS PRN
Status: DISCONTINUED | OUTPATIENT
Start: 2024-06-11 | End: 2024-06-13 | Stop reason: HOSPADM

## 2024-06-11 RX ORDER — MONTELUKAST SODIUM 10 MG/1
10 TABLET ORAL AT BEDTIME
Status: DISCONTINUED | OUTPATIENT
Start: 2024-06-11 | End: 2024-06-13 | Stop reason: HOSPADM

## 2024-06-11 RX ORDER — COLCHICINE 0.6 MG/1
0.6 TABLET ORAL DAILY
Status: DISCONTINUED | OUTPATIENT
Start: 2024-06-11 | End: 2024-06-13 | Stop reason: HOSPADM

## 2024-06-11 RX ORDER — HYDRALAZINE HYDROCHLORIDE 20 MG/ML
10 INJECTION INTRAMUSCULAR; INTRAVENOUS EVERY 6 HOURS PRN
Status: DISCONTINUED | OUTPATIENT
Start: 2024-06-11 | End: 2024-06-13 | Stop reason: HOSPADM

## 2024-06-11 RX ORDER — LEVOTHYROXINE SODIUM 0.03 MG/1
25 TABLET ORAL DAILY
Status: DISCONTINUED | OUTPATIENT
Start: 2024-06-11 | End: 2024-06-13 | Stop reason: HOSPADM

## 2024-06-11 RX ORDER — HYDROCODONE BITARTRATE AND ACETAMINOPHEN 10; 325 MG/1; MG/1
1 TABLET ORAL EVERY 4 HOURS PRN
Status: DISCONTINUED | OUTPATIENT
Start: 2024-06-11 | End: 2024-06-13 | Stop reason: HOSPADM

## 2024-06-11 RX ORDER — FUROSEMIDE 10 MG/ML
40 INJECTION INTRAMUSCULAR; INTRAVENOUS ONCE
Status: COMPLETED | OUTPATIENT
Start: 2024-06-11 | End: 2024-06-11

## 2024-06-11 RX ORDER — POTASSIUM CHLORIDE 14.9 MG/ML
20 INJECTION INTRAVENOUS ONCE
Status: COMPLETED | OUTPATIENT
Start: 2024-06-11 | End: 2024-06-11

## 2024-06-11 RX ORDER — ACETAMINOPHEN 325 MG/1
650 TABLET ORAL EVERY 4 HOURS PRN
Status: DISCONTINUED | OUTPATIENT
Start: 2024-06-11 | End: 2024-06-13 | Stop reason: HOSPADM

## 2024-06-11 RX ORDER — LANOLIN ALCOHOL/MO/W.PET/CERES
3 CREAM (GRAM) TOPICAL NIGHTLY PRN
Status: DISCONTINUED | OUTPATIENT
Start: 2024-06-11 | End: 2024-06-13 | Stop reason: HOSPADM

## 2024-06-11 RX ORDER — PREDNISONE 10 MG/1
10 TABLET ORAL DAILY
Status: DISCONTINUED | OUTPATIENT
Start: 2024-06-11 | End: 2024-06-13 | Stop reason: HOSPADM

## 2024-06-11 RX ORDER — POLYETHYLENE GLYCOL 3350 17 G/17G
17 POWDER, FOR SOLUTION ORAL DAILY PRN
Status: DISCONTINUED | OUTPATIENT
Start: 2024-06-11 | End: 2024-06-13 | Stop reason: HOSPADM

## 2024-06-11 RX ORDER — MAGNESIUM HYDROXIDE/ALUMINUM HYDROXICE/SIMETHICONE 120; 1200; 1200 MG/30ML; MG/30ML; MG/30ML
30 SUSPENSION ORAL EVERY 4 HOURS PRN
Status: DISCONTINUED | OUTPATIENT
Start: 2024-06-11 | End: 2024-06-13 | Stop reason: HOSPADM

## 2024-06-11 RX ORDER — POTASSIUM CHLORIDE 20 MEQ/1
40 TABLET, EXTENDED RELEASE ORAL ONCE
Status: DISCONTINUED | OUTPATIENT
Start: 2024-06-11 | End: 2024-06-11

## 2024-06-11 RX ORDER — BISACODYL 10 MG
10 SUPPOSITORY, RECTAL RECTAL DAILY PRN
Status: DISCONTINUED | OUTPATIENT
Start: 2024-06-11 | End: 2024-06-13 | Stop reason: HOSPADM

## 2024-06-11 RX ORDER — GABAPENTIN 400 MG/1
1200 CAPSULE ORAL EVERY 12 HOURS SCHEDULED
Status: DISCONTINUED | OUTPATIENT
Start: 2024-06-11 | End: 2024-06-13 | Stop reason: HOSPADM

## 2024-06-11 RX ORDER — CARVEDILOL 6.25 MG/1
6.25 TABLET ORAL
Status: DISCONTINUED | OUTPATIENT
Start: 2024-06-11 | End: 2024-06-13 | Stop reason: HOSPADM

## 2024-06-11 RX ORDER — HYDROCODONE BITARTRATE AND ACETAMINOPHEN 5; 325 MG/1; MG/1
1 TABLET ORAL EVERY 4 HOURS PRN
Status: DISCONTINUED | OUTPATIENT
Start: 2024-06-11 | End: 2024-06-11

## 2024-06-11 RX ORDER — AMOXICILLIN 250 MG
2 CAPSULE ORAL 2 TIMES DAILY PRN
Status: DISCONTINUED | OUTPATIENT
Start: 2024-06-11 | End: 2024-06-13 | Stop reason: HOSPADM

## 2024-06-11 RX ADMIN — COLCHICINE 0.6 MG: 0.6 TABLET ORAL at 14:56

## 2024-06-11 RX ADMIN — FUROSEMIDE 40 MG: 10 INJECTION, SOLUTION INTRAMUSCULAR; INTRAVENOUS at 01:14

## 2024-06-11 RX ADMIN — NIFEDIPINE 30 MG: 30 TABLET, EXTENDED RELEASE ORAL at 11:56

## 2024-06-11 RX ADMIN — GABAPENTIN 1200 MG: 400 CAPSULE ORAL at 11:50

## 2024-06-11 RX ADMIN — PREDNISONE 10 MG: 10 TABLET ORAL at 11:49

## 2024-06-11 RX ADMIN — MAGNESIUM SULFATE HEPTAHYDRATE 4 G: 80 INJECTION, SOLUTION INTRAVENOUS at 07:02

## 2024-06-11 RX ADMIN — SODIUM CHLORIDE, PRESERVATIVE FREE 2 ML: 5 INJECTION INTRAVENOUS at 11:51

## 2024-06-11 RX ADMIN — SODIUM CHLORIDE, PRESERVATIVE FREE 2 ML: 5 INJECTION INTRAVENOUS at 21:08

## 2024-06-11 RX ADMIN — APIXABAN 2.5 MG: 2.5 TABLET, FILM COATED ORAL at 11:50

## 2024-06-11 RX ADMIN — LEVOTHYROXINE SODIUM 25 MCG: 0.03 TABLET ORAL at 11:50

## 2024-06-11 RX ADMIN — POTASSIUM CHLORIDE 20 MEQ: 14.9 INJECTION, SOLUTION INTRAVENOUS at 14:57

## 2024-06-11 RX ADMIN — POTASSIUM CHLORIDE 20 MEQ: 14.9 INJECTION, SOLUTION INTRAVENOUS at 18:24

## 2024-06-11 RX ADMIN — MONTELUKAST SODIUM 10 MG: 10 TABLET, FILM COATED ORAL at 21:07

## 2024-06-11 RX ADMIN — TACROLIMUS 1 MG: 1 CAPSULE ORAL at 21:07

## 2024-06-11 RX ADMIN — GABAPENTIN 1200 MG: 400 CAPSULE ORAL at 21:07

## 2024-06-11 RX ADMIN — TACROLIMUS 1 MG: 1 CAPSULE ORAL at 11:49

## 2024-06-11 RX ADMIN — CARVEDILOL 6.25 MG: 6.25 TABLET, FILM COATED ORAL at 18:28

## 2024-06-11 RX ADMIN — APIXABAN 2.5 MG: 2.5 TABLET, FILM COATED ORAL at 21:07

## 2024-06-11 SDOH — SOCIAL STABILITY: SOCIAL INSECURITY: HOW OFTEN DOES ANYONE, INCLUDING FAMILY AND FRIENDS, INSULT OR TALK DOWN TO YOU?: NEVER

## 2024-06-11 SDOH — ECONOMIC STABILITY: FOOD INSECURITY: WITHIN THE PAST 12 MONTHS, THE FOOD YOU BOUGHT JUST DIDN'T LAST AND YOU DIDN'T HAVE MONEY TO GET MORE.: NEVER TRUE

## 2024-06-11 SDOH — ECONOMIC STABILITY: INCOME INSECURITY: IN THE PAST 12 MONTHS, HAS THE ELECTRIC, GAS, OIL, OR WATER COMPANY THREATENED TO SHUT OFF SERVICE IN YOUR HOME?: NO

## 2024-06-11 SDOH — HEALTH STABILITY: GENERAL: BECAUSE OF A PHYSICAL, MENTAL, OR EMOTIONAL CONDITION, DO YOU HAVE DIFFICULTY DOING ERRANDS ALONE?: YES

## 2024-06-11 SDOH — ECONOMIC STABILITY: HOUSING INSECURITY: WHAT IS YOUR LIVING SITUATION TODAY?: I HAVE A STEADY PLACE TO LIVE

## 2024-06-11 SDOH — ECONOMIC STABILITY: GENERAL

## 2024-06-11 SDOH — SOCIAL STABILITY: SOCIAL INSECURITY: HOW OFTEN DOES ANYONE, INCLUDING FAMILY AND FRIENDS, THREATEN YOU WITH HARM?: NEVER

## 2024-06-11 SDOH — SOCIAL STABILITY: SOCIAL NETWORK: SUPPORT SYSTEMS: CHILDREN

## 2024-06-11 SDOH — ECONOMIC STABILITY: HOUSING INSECURITY: WHAT IS YOUR LIVING SITUATION TODAY?: ADULT CHILDREN

## 2024-06-11 SDOH — ECONOMIC STABILITY: HOUSING INSECURITY: DO YOU HAVE PROBLEMS WITH ANY OF THE FOLLOWING?: NONE OF THE ABOVE

## 2024-06-11 SDOH — SOCIAL STABILITY: SOCIAL INSECURITY: HOW OFTEN DOES ANYONE, INCLUDING FAMILY AND FRIENDS, PHYSICALLY HURT YOU?: NEVER

## 2024-06-11 SDOH — SOCIAL STABILITY: SOCIAL INSECURITY: HOW OFTEN DOES ANYONE, INCLUDING FAMILY AND FRIENDS, SCREAM OR CURSE AT YOU?: NEVER

## 2024-06-11 SDOH — ECONOMIC STABILITY: HOUSING INSECURITY: WHAT IS YOUR LIVING SITUATION TODAY?: HOUSE

## 2024-06-11 SDOH — ECONOMIC STABILITY: GENERAL: WOULD YOU LIKE HELP WITH ANY OF THE FOLLOWING NEEDS?: I DON'T WANT HELP WITH ANY OF THESE

## 2024-06-11 SDOH — HEALTH STABILITY: PHYSICAL HEALTH: DO YOU HAVE DIFFICULTY DRESSING OR BATHING?: NO

## 2024-06-11 SDOH — HEALTH STABILITY: PHYSICAL HEALTH: DO YOU HAVE SERIOUS DIFFICULTY WALKING OR CLIMBING STAIRS?: YES

## 2024-06-11 ASSESSMENT — ACTIVITIES OF DAILY LIVING (ADL)
RECENT_DECLINE_ADL: NO
ADL_SHORT_OF_BREATH: NO
ADL_SCORE: 12
HOME_MANAGEMENT_TIME_ENTRY: 11
ADL_BEFORE_ADMISSION: INDEPENDENT
PRIOR_ADL: INDEPENDENT

## 2024-06-11 ASSESSMENT — COGNITIVE AND FUNCTIONAL STATUS - GENERAL
HELP NEEDED DRESSING REGULAR LOWER BODY CLOTHING: A LITTLE
DAILY_ACTIVITY_CONVERTED_SCORE: 42.03
HELP NEEDED FOR BATHING: A LITTLE
BECAUSE OF A PHYSICAL, MENTAL, OR EMOTIONAL CONDITION, DO YOU HAVE DIFFICULTY DOING ERRANDS ALONE: NO
BECAUSE OF A PHYSICAL, MENTAL, OR EMOTIONAL CONDITION, DO YOU HAVE SERIOUS DIFFICULTY CONCENTRATING, REMEMBERING OR MAKING DECISIONS: NO
DO YOU HAVE DIFFICULTY DRESSING OR BATHING: NO
HELP NEEDED FOR TOILETING: A LITTLE
DO YOU HAVE SERIOUS DIFFICULTY WALKING OR CLIMBING STAIRS: YES
HELP NEEDED DRESSING REGULAR UPPER BODY CLOTHING: A LITTLE
DAILY_ACTIVITY_RAW_SCORE: 20

## 2024-06-11 ASSESSMENT — PATIENT HEALTH QUESTIONNAIRE - PHQ9
CLINICAL INTERPRETATION OF PHQ2 SCORE: NO FURTHER SCREENING NEEDED
SUM OF ALL RESPONSES TO PHQ9 QUESTIONS 1 AND 2: 0
IS PATIENT ABLE TO COMPLETE PHQ2 OR PHQ9: YES
SUM OF ALL RESPONSES TO PHQ9 QUESTIONS 1 AND 2: 0
2. FEELING DOWN, DEPRESSED OR HOPELESS: NOT AT ALL
1. LITTLE INTEREST OR PLEASURE IN DOING THINGS: NOT AT ALL

## 2024-06-11 ASSESSMENT — ORIENTATION MEMORY CONCENTRATION TEST (OMCT)
OMCT SCORE: 0
REPEAT THE NAME AND ADDRESS I ASKED YOU TO REMEMBER: CORRECT
SAY THE MONTHS IN REVERSE ORDER STARTING WITH LAST MONTH: CORRECT
WHAT YEAR IS IT NOW (MUST BE EXACT): CORRECT
WHAT TIME IS IT (NO WATCH OR CLOCK): CORRECT
COUNT BACKWARDS FROM 20 TO 1: CORRECT
OMCT INTERPRETATION: 0-6: NO SIGNIFICANT IMPAIRMENT
WHAT MONTH IS IT NOW: CORRECT

## 2024-06-11 ASSESSMENT — PAIN SCALES - GENERAL
PAINLEVEL_OUTOF10: 0

## 2024-06-11 ASSESSMENT — LIFESTYLE VARIABLES
HOW MANY STANDARD DRINKS CONTAINING ALCOHOL DO YOU HAVE ON A TYPICAL DAY: 0,1 OR 2
HOW OFTEN DO YOU HAVE 6 OR MORE DRINKS ON ONE OCCASION: NEVER
AUDIT-C TOTAL SCORE: 0
ALCOHOL_USE_STATUS: NO OR LOW RISK WITH VALIDATED TOOL
HOW OFTEN DO YOU HAVE A DRINK CONTAINING ALCOHOL: NEVER

## 2024-06-11 ASSESSMENT — COLUMBIA-SUICIDE SEVERITY RATING SCALE - C-SSRS
IS THE PATIENT ABLE TO COMPLETE C-SSRS: YES
6. HAVE YOU EVER DONE ANYTHING, STARTED TO DO ANYTHING, OR PREPARED TO DO ANYTHING TO END YOUR LIFE?: NO
1. WITHIN THE PAST MONTH, HAVE YOU WISHED YOU WERE DEAD OR WISHED YOU COULD GO TO SLEEP AND NOT WAKE UP?: NO
2. HAVE YOU ACTUALLY HAD ANY THOUGHTS OF KILLING YOURSELF?: NO

## 2024-06-12 LAB
ANION GAP SERPL CALC-SCNC: 8 MMOL/L (ref 7–19)
BASOPHILS # BLD: 0 K/MCL (ref 0–0.3)
BASOPHILS NFR BLD: 1 %
BUN SERPL-MCNC: 10 MG/DL (ref 6–20)
BUN/CREAT SERPL: 14 (ref 7–25)
CALCIUM SERPL-MCNC: 8.1 MG/DL (ref 8.4–10.2)
CHLORIDE SERPL-SCNC: 104 MMOL/L (ref 97–110)
CO2 SERPL-SCNC: 31 MMOL/L (ref 21–32)
CREAT SERPL-MCNC: 0.69 MG/DL (ref 0.51–0.95)
DEPRECATED RDW RBC: 46 FL (ref 39–50)
EGFRCR SERPLBLD CKD-EPI 2021: >90 ML/MIN/{1.73_M2}
EOSINOPHIL # BLD: 0.1 K/MCL (ref 0–0.5)
EOSINOPHIL NFR BLD: 2 %
ERYTHROCYTE [DISTWIDTH] IN BLOOD: 13.5 % (ref 11–15)
FASTING DURATION TIME PATIENT: ABNORMAL H
GLUCOSE SERPL-MCNC: 87 MG/DL (ref 70–99)
HCT VFR BLD CALC: 41.1 % (ref 36–46.5)
HGB BLD-MCNC: 13.7 G/DL (ref 12–15.5)
IMM GRANULOCYTES # BLD AUTO: 0 K/MCL (ref 0–0.2)
IMM GRANULOCYTES # BLD: 1 %
LYMPHOCYTES # BLD: 1.3 K/MCL (ref 1–4)
LYMPHOCYTES NFR BLD: 16 %
MAGNESIUM SERPL-MCNC: 1.9 MG/DL (ref 1.7–2.4)
MCH RBC QN AUTO: 31.2 PG (ref 26–34)
MCHC RBC AUTO-ENTMCNC: 33.3 G/DL (ref 32–36.5)
MCV RBC AUTO: 93.6 FL (ref 78–100)
MONOCYTES # BLD: 0.7 K/MCL (ref 0.3–0.9)
MONOCYTES NFR BLD: 9 %
NEUTROPHILS # BLD: 5.5 K/MCL (ref 1.8–7.7)
NEUTROPHILS NFR BLD: 72 %
NRBC BLD MANUAL-RTO: 0 /100 WBC
PLATELET # BLD AUTO: 194 K/MCL (ref 140–450)
POTASSIUM SERPL-SCNC: 3.9 MMOL/L (ref 3.4–5.1)
RBC # BLD: 4.39 MIL/MCL (ref 4–5.2)
SODIUM SERPL-SCNC: 139 MMOL/L (ref 135–145)
TACROLIMUS BLD-MCNC: 7.5 NG/ML (ref 5–20)
WBC # BLD: 7.6 K/MCL (ref 4.2–11)

## 2024-06-12 PROCEDURE — 10004651 HB RX, NO CHARGE ITEM: Performed by: EMERGENCY MEDICINE

## 2024-06-12 PROCEDURE — 10006031 HB ROOM CHARGE TELEMETRY

## 2024-06-12 PROCEDURE — 10002803 HB RX 637: Performed by: INTERNAL MEDICINE

## 2024-06-12 PROCEDURE — 80197 ASSAY OF TACROLIMUS: CPT | Performed by: INTERNAL MEDICINE

## 2024-06-12 PROCEDURE — 80048 BASIC METABOLIC PNL TOTAL CA: CPT | Performed by: INTERNAL MEDICINE

## 2024-06-12 PROCEDURE — 85025 COMPLETE CBC W/AUTO DIFF WBC: CPT | Performed by: INTERNAL MEDICINE

## 2024-06-12 PROCEDURE — 99233 SBSQ HOSP IP/OBS HIGH 50: CPT | Performed by: INTERNAL MEDICINE

## 2024-06-12 PROCEDURE — 97116 GAIT TRAINING THERAPY: CPT

## 2024-06-12 PROCEDURE — 97161 PT EVAL LOW COMPLEX 20 MIN: CPT

## 2024-06-12 PROCEDURE — 83735 ASSAY OF MAGNESIUM: CPT | Performed by: INTERNAL MEDICINE

## 2024-06-12 PROCEDURE — 10002801 HB RX 250 W/O HCPCS: Performed by: INTERNAL MEDICINE

## 2024-06-12 RX ORDER — FUROSEMIDE 20 MG/1
20 TABLET ORAL DAILY
Status: DISCONTINUED | OUTPATIENT
Start: 2024-06-12 | End: 2024-06-13 | Stop reason: HOSPADM

## 2024-06-12 RX ORDER — 0.9 % SODIUM CHLORIDE 0.9 %
10 VIAL (ML) INJECTION PRN
Status: DISCONTINUED | OUTPATIENT
Start: 2024-06-12 | End: 2024-06-13 | Stop reason: HOSPADM

## 2024-06-12 RX ADMIN — PREDNISONE 10 MG: 10 TABLET ORAL at 09:56

## 2024-06-12 RX ADMIN — TACROLIMUS 1 MG: 1 CAPSULE ORAL at 09:55

## 2024-06-12 RX ADMIN — HYDROCODONE BITARTRATE AND ACETAMINOPHEN 1 TABLET: 10; 325 TABLET ORAL at 12:53

## 2024-06-12 RX ADMIN — SULFAMETHOXAZOLE AND TRIMETHOPRIM 1 TABLET: 800; 160 TABLET ORAL at 09:56

## 2024-06-12 RX ADMIN — LEVOTHYROXINE SODIUM 25 MCG: 0.03 TABLET ORAL at 09:55

## 2024-06-12 RX ADMIN — APIXABAN 2.5 MG: 2.5 TABLET, FILM COATED ORAL at 20:44

## 2024-06-12 RX ADMIN — TACROLIMUS 1 MG: 1 CAPSULE ORAL at 20:45

## 2024-06-12 RX ADMIN — COLCHICINE 0.6 MG: 0.6 TABLET ORAL at 09:56

## 2024-06-12 RX ADMIN — APIXABAN 2.5 MG: 2.5 TABLET, FILM COATED ORAL at 09:55

## 2024-06-12 RX ADMIN — CARVEDILOL 6.25 MG: 6.25 TABLET, FILM COATED ORAL at 17:33

## 2024-06-12 RX ADMIN — GABAPENTIN 1200 MG: 400 CAPSULE ORAL at 20:44

## 2024-06-12 RX ADMIN — FUROSEMIDE 20 MG: 20 TABLET ORAL at 10:21

## 2024-06-12 RX ADMIN — NIFEDIPINE 30 MG: 30 TABLET, EXTENDED RELEASE ORAL at 09:55

## 2024-06-12 RX ADMIN — MONTELUKAST SODIUM 10 MG: 10 TABLET, FILM COATED ORAL at 20:44

## 2024-06-12 RX ADMIN — SODIUM CHLORIDE, PRESERVATIVE FREE 2 ML: 5 INJECTION INTRAVENOUS at 20:47

## 2024-06-12 RX ADMIN — GABAPENTIN 1200 MG: 400 CAPSULE ORAL at 09:56

## 2024-06-12 RX ADMIN — HYDROCODONE BITARTRATE AND ACETAMINOPHEN 1 TABLET: 10; 325 TABLET ORAL at 22:30

## 2024-06-12 ASSESSMENT — PAIN SCALES - GENERAL
PAINLEVEL_OUTOF10: 2
PAINLEVEL_OUTOF10: 1
PAINLEVEL_OUTOF10: 2
PAINLEVEL_OUTOF10: 8
PAINLEVEL_OUTOF10: 0
PAINLEVEL_OUTOF10: 6

## 2024-06-12 ASSESSMENT — COGNITIVE AND FUNCTIONAL STATUS - GENERAL
BASIC_MOBILITY_RAW_SCORE: 19
BASIC_MOBILITY_CONVERTED_SCORE: 42.48

## 2024-06-13 VITALS
DIASTOLIC BLOOD PRESSURE: 61 MMHG | OXYGEN SATURATION: 92 % | RESPIRATION RATE: 18 BRPM | HEART RATE: 56 BPM | HEIGHT: 62 IN | TEMPERATURE: 97.9 F | BODY MASS INDEX: 24.1 KG/M2 | SYSTOLIC BLOOD PRESSURE: 110 MMHG | WEIGHT: 130.95 LBS

## 2024-06-13 LAB
ANION GAP SERPL CALC-SCNC: 10 MMOL/L (ref 7–19)
BASOPHILS # BLD: 0 K/MCL (ref 0–0.3)
BASOPHILS NFR BLD: 1 %
BUN SERPL-MCNC: 14 MG/DL (ref 6–20)
BUN/CREAT SERPL: 17 (ref 7–25)
CALCIUM SERPL-MCNC: 8.3 MG/DL (ref 8.4–10.2)
CHLORIDE SERPL-SCNC: 105 MMOL/L (ref 97–110)
CO2 SERPL-SCNC: 30 MMOL/L (ref 21–32)
CREAT SERPL-MCNC: 0.83 MG/DL (ref 0.51–0.95)
DEPRECATED RDW RBC: 47.8 FL (ref 39–50)
EGFRCR SERPLBLD CKD-EPI 2021: 74 ML/MIN/{1.73_M2}
EOSINOPHIL # BLD: 0.1 K/MCL (ref 0–0.5)
EOSINOPHIL NFR BLD: 2 %
ERYTHROCYTE [DISTWIDTH] IN BLOOD: 13.5 % (ref 11–15)
FASTING DURATION TIME PATIENT: ABNORMAL H
GLUCOSE SERPL-MCNC: 81 MG/DL (ref 70–99)
HCT VFR BLD CALC: 41 % (ref 36–46.5)
HGB BLD-MCNC: 13.5 G/DL (ref 12–15.5)
IMM GRANULOCYTES # BLD AUTO: 0.1 K/MCL (ref 0–0.2)
IMM GRANULOCYTES # BLD: 1 %
LYMPHOCYTES # BLD: 1.6 K/MCL (ref 1–4)
LYMPHOCYTES NFR BLD: 20 %
MAGNESIUM SERPL-MCNC: 1.8 MG/DL (ref 1.7–2.4)
MCH RBC QN AUTO: 31.6 PG (ref 26–34)
MCHC RBC AUTO-ENTMCNC: 32.9 G/DL (ref 32–36.5)
MCV RBC AUTO: 96 FL (ref 78–100)
MONOCYTES # BLD: 0.7 K/MCL (ref 0.3–0.9)
MONOCYTES NFR BLD: 10 %
NEUTROPHILS # BLD: 5.3 K/MCL (ref 1.8–7.7)
NEUTROPHILS NFR BLD: 66 %
NRBC BLD MANUAL-RTO: 0 /100 WBC
PLATELET # BLD AUTO: 194 K/MCL (ref 140–450)
POTASSIUM SERPL-SCNC: 3.9 MMOL/L (ref 3.4–5.1)
RBC # BLD: 4.27 MIL/MCL (ref 4–5.2)
SODIUM SERPL-SCNC: 141 MMOL/L (ref 135–145)
WBC # BLD: 7.8 K/MCL (ref 4.2–11)

## 2024-06-13 PROCEDURE — 83735 ASSAY OF MAGNESIUM: CPT | Performed by: INTERNAL MEDICINE

## 2024-06-13 PROCEDURE — 10002803 HB RX 637: Performed by: INTERNAL MEDICINE

## 2024-06-13 PROCEDURE — 99239 HOSP IP/OBS DSCHRG MGMT >30: CPT | Performed by: INTERNAL MEDICINE

## 2024-06-13 PROCEDURE — 85025 COMPLETE CBC W/AUTO DIFF WBC: CPT | Performed by: INTERNAL MEDICINE

## 2024-06-13 PROCEDURE — 80048 BASIC METABOLIC PNL TOTAL CA: CPT | Performed by: INTERNAL MEDICINE

## 2024-06-13 PROCEDURE — 10004651 HB RX, NO CHARGE ITEM: Performed by: EMERGENCY MEDICINE

## 2024-06-13 RX ORDER — ELECTROLYTES/DEXTROSE
1 SOLUTION, ORAL ORAL 2 TIMES DAILY
Qty: 90 TABLET | Refills: 3 | Status: SHIPPED | OUTPATIENT
Start: 2024-06-13 | End: 2025-06-13

## 2024-06-13 RX ADMIN — FUROSEMIDE 20 MG: 20 TABLET ORAL at 09:41

## 2024-06-13 RX ADMIN — NIFEDIPINE 30 MG: 30 TABLET, EXTENDED RELEASE ORAL at 09:41

## 2024-06-13 RX ADMIN — COLCHICINE 0.6 MG: 0.6 TABLET ORAL at 09:41

## 2024-06-13 RX ADMIN — SODIUM CHLORIDE, PRESERVATIVE FREE 2 ML: 5 INJECTION INTRAVENOUS at 09:44

## 2024-06-13 RX ADMIN — TACROLIMUS 1 MG: 1 CAPSULE ORAL at 09:41

## 2024-06-13 RX ADMIN — GABAPENTIN 1200 MG: 400 CAPSULE ORAL at 09:41

## 2024-06-13 RX ADMIN — PREDNISONE 10 MG: 10 TABLET ORAL at 09:41

## 2024-06-13 RX ADMIN — APIXABAN 2.5 MG: 2.5 TABLET, FILM COATED ORAL at 09:41

## 2024-06-13 RX ADMIN — LEVOTHYROXINE SODIUM 25 MCG: 0.03 TABLET ORAL at 09:41

## 2024-06-13 ASSESSMENT — PAIN SCALES - GENERAL: PAINLEVEL_OUTOF10: 0

## 2024-06-15 ENCOUNTER — TELEPHONE (OUTPATIENT)
Dept: CARE COORDINATION | Age: 74
End: 2024-06-15

## 2024-06-18 ENCOUNTER — TELEPHONE (OUTPATIENT)
Dept: CARE COORDINATION | Age: 74
End: 2024-06-18

## 2024-06-20 ENCOUNTER — TELEPHONE (OUTPATIENT)
Dept: CARE COORDINATION | Age: 74
End: 2024-06-20

## 2024-06-22 ENCOUNTER — TELEPHONE (OUTPATIENT)
Dept: CARE COORDINATION | Age: 74
End: 2024-06-22

## 2024-06-23 ENCOUNTER — CASE MANAGEMENT (OUTPATIENT)
Dept: CARE COORDINATION | Age: 74
End: 2024-06-23

## 2024-06-26 ENCOUNTER — CASE MANAGEMENT (OUTPATIENT)
Dept: CARE COORDINATION | Age: 74
End: 2024-06-26

## 2024-07-29 ENCOUNTER — CASE MANAGEMENT (OUTPATIENT)
Dept: CARE COORDINATION | Age: 74
End: 2024-07-29

## (undated) NOTE — ED AVS SNAPSHOT
Miguel Barnes   MRN: [id number]    Department:  [facility] Emergency Department   Date of Visit:  12/3/2017           Disclosure     Insurance plans vary and the physician(s) referred by the ER may not be covered by your plan.  Please conta within the next three months to obtain basic health screening including reassessment of your blood pressure.     IF THERE IS ANY CHANGE OR WORSENING OF YOUR CONDITION, CALL YOUR PRIMARY CARE PHYSICIAN AT ONCE OR RETURN IMMEDIATELY TO THE EMERGENCY DEPARTMEN